# Patient Record
Sex: MALE | Race: WHITE | NOT HISPANIC OR LATINO | URBAN - METROPOLITAN AREA
[De-identification: names, ages, dates, MRNs, and addresses within clinical notes are randomized per-mention and may not be internally consistent; named-entity substitution may affect disease eponyms.]

---

## 2023-02-22 ENCOUNTER — INPATIENT (INPATIENT)
Facility: HOSPITAL | Age: 73
LOS: 2 days | Discharge: HOME HEALTH | DRG: 193 | End: 2023-02-25
Attending: INTERNAL MEDICINE | Admitting: INTERNAL MEDICINE
Payer: COMMERCIAL

## 2023-02-22 VITALS
WEIGHT: 199.96 LBS | RESPIRATION RATE: 19 BRPM | OXYGEN SATURATION: 94 % | TEMPERATURE: 98 F | HEIGHT: 68 IN | HEART RATE: 72 BPM

## 2023-02-22 DIAGNOSIS — J06.9 ACUTE UPPER RESPIRATORY INFECTION, UNSPECIFIED: ICD-10-CM

## 2023-02-22 LAB
ALBUMIN SERPL ELPH-MCNC: 4.3 G/DL — SIGNIFICANT CHANGE UP (ref 3.3–5)
ALP SERPL-CCNC: 58 U/L — SIGNIFICANT CHANGE UP (ref 40–120)
ALT FLD-CCNC: 20 U/L — SIGNIFICANT CHANGE UP (ref 10–45)
ANION GAP SERPL CALC-SCNC: 15 MMOL/L — SIGNIFICANT CHANGE UP (ref 5–17)
AST SERPL-CCNC: 23 U/L — SIGNIFICANT CHANGE UP (ref 10–40)
BASE EXCESS BLDV CALC-SCNC: 5.2 MMOL/L — HIGH (ref -2–3)
BASOPHILS # BLD AUTO: 0.04 K/UL — SIGNIFICANT CHANGE UP (ref 0–0.2)
BASOPHILS NFR BLD AUTO: 0.5 % — SIGNIFICANT CHANGE UP (ref 0–2)
BILIRUB SERPL-MCNC: 0.6 MG/DL — SIGNIFICANT CHANGE UP (ref 0.2–1.2)
BUN SERPL-MCNC: 13 MG/DL — SIGNIFICANT CHANGE UP (ref 7–23)
CA-I SERPL-SCNC: 1.17 MMOL/L — SIGNIFICANT CHANGE UP (ref 1.15–1.33)
CALCIUM SERPL-MCNC: 9.2 MG/DL — SIGNIFICANT CHANGE UP (ref 8.4–10.5)
CHLORIDE BLDV-SCNC: 99 MMOL/L — SIGNIFICANT CHANGE UP (ref 96–108)
CHLORIDE SERPL-SCNC: 99 MMOL/L — SIGNIFICANT CHANGE UP (ref 96–108)
CO2 BLDV-SCNC: 33 MMOL/L — HIGH (ref 22–26)
CO2 SERPL-SCNC: 24 MMOL/L — SIGNIFICANT CHANGE UP (ref 22–31)
CREAT SERPL-MCNC: 0.87 MG/DL — SIGNIFICANT CHANGE UP (ref 0.5–1.3)
EGFR: 91 ML/MIN/1.73M2 — SIGNIFICANT CHANGE UP
EOSINOPHIL # BLD AUTO: 0.12 K/UL — SIGNIFICANT CHANGE UP (ref 0–0.5)
EOSINOPHIL NFR BLD AUTO: 1.5 % — SIGNIFICANT CHANGE UP (ref 0–6)
GAS PNL BLDV: 135 MMOL/L — LOW (ref 136–145)
GAS PNL BLDV: SIGNIFICANT CHANGE UP
GLUCOSE BLDC GLUCOMTR-MCNC: 199 MG/DL — HIGH (ref 70–99)
GLUCOSE BLDV-MCNC: 170 MG/DL — HIGH (ref 70–99)
GLUCOSE SERPL-MCNC: 160 MG/DL — HIGH (ref 70–99)
HCO3 BLDV-SCNC: 31 MMOL/L — HIGH (ref 22–29)
HCT VFR BLD CALC: 47.3 % — SIGNIFICANT CHANGE UP (ref 39–50)
HCT VFR BLDA CALC: 47 % — SIGNIFICANT CHANGE UP (ref 39–51)
HGB BLD CALC-MCNC: 15.6 G/DL — SIGNIFICANT CHANGE UP (ref 12.6–17.4)
HGB BLD-MCNC: 15.1 G/DL — SIGNIFICANT CHANGE UP (ref 13–17)
HMPV RNA SPEC QL NAA+PROBE: DETECTED
IMM GRANULOCYTES NFR BLD AUTO: 0.2 % — SIGNIFICANT CHANGE UP (ref 0–0.9)
LACTATE BLDV-MCNC: 2 MMOL/L — SIGNIFICANT CHANGE UP (ref 0.5–2)
LYMPHOCYTES # BLD AUTO: 3.22 K/UL — SIGNIFICANT CHANGE UP (ref 1–3.3)
LYMPHOCYTES # BLD AUTO: 40 % — SIGNIFICANT CHANGE UP (ref 13–44)
MCHC RBC-ENTMCNC: 31.5 PG — SIGNIFICANT CHANGE UP (ref 27–34)
MCHC RBC-ENTMCNC: 31.9 GM/DL — LOW (ref 32–36)
MCV RBC AUTO: 98.7 FL — SIGNIFICANT CHANGE UP (ref 80–100)
MONOCYTES # BLD AUTO: 1.03 K/UL — HIGH (ref 0–0.9)
MONOCYTES NFR BLD AUTO: 12.8 % — SIGNIFICANT CHANGE UP (ref 2–14)
NEUTROPHILS # BLD AUTO: 3.62 K/UL — SIGNIFICANT CHANGE UP (ref 1.8–7.4)
NEUTROPHILS NFR BLD AUTO: 45 % — SIGNIFICANT CHANGE UP (ref 43–77)
NRBC # BLD: 0 /100 WBCS — SIGNIFICANT CHANGE UP (ref 0–0)
NT-PROBNP SERPL-SCNC: 244 PG/ML — SIGNIFICANT CHANGE UP (ref 0–300)
PCO2 BLDV: 49 MMHG — SIGNIFICANT CHANGE UP (ref 42–55)
PH BLDV: 7.41 — SIGNIFICANT CHANGE UP (ref 7.32–7.43)
PLATELET # BLD AUTO: 162 K/UL — SIGNIFICANT CHANGE UP (ref 150–400)
PO2 BLDV: 47 MMHG — HIGH (ref 25–45)
POTASSIUM BLDV-SCNC: 4.1 MMOL/L — SIGNIFICANT CHANGE UP (ref 3.5–5.1)
POTASSIUM SERPL-MCNC: 4.1 MMOL/L — SIGNIFICANT CHANGE UP (ref 3.5–5.3)
POTASSIUM SERPL-SCNC: 4.1 MMOL/L — SIGNIFICANT CHANGE UP (ref 3.5–5.3)
PROCALCITONIN SERPL-MCNC: 0.07 NG/ML — SIGNIFICANT CHANGE UP (ref 0.02–0.1)
PROT SERPL-MCNC: 7.5 G/DL — SIGNIFICANT CHANGE UP (ref 6–8.3)
RAPID RVP RESULT: DETECTED
RBC # BLD: 4.79 M/UL — SIGNIFICANT CHANGE UP (ref 4.2–5.8)
RBC # FLD: 13.9 % — SIGNIFICANT CHANGE UP (ref 10.3–14.5)
SAO2 % BLDV: 84.3 % — SIGNIFICANT CHANGE UP (ref 67–88)
SARS-COV-2 RNA SPEC QL NAA+PROBE: SIGNIFICANT CHANGE UP
SODIUM SERPL-SCNC: 138 MMOL/L — SIGNIFICANT CHANGE UP (ref 135–145)
TROPONIN T, HIGH SENSITIVITY RESULT: 13 NG/L — SIGNIFICANT CHANGE UP (ref 0–51)
TROPONIN T, HIGH SENSITIVITY RESULT: 15 NG/L — SIGNIFICANT CHANGE UP (ref 0–51)
WBC # BLD: 8.05 K/UL — SIGNIFICANT CHANGE UP (ref 3.8–10.5)
WBC # FLD AUTO: 8.05 K/UL — SIGNIFICANT CHANGE UP (ref 3.8–10.5)

## 2023-02-22 PROCEDURE — 99291 CRITICAL CARE FIRST HOUR: CPT

## 2023-02-22 PROCEDURE — 71045 X-RAY EXAM CHEST 1 VIEW: CPT | Mod: 26

## 2023-02-22 RX ORDER — ATORVASTATIN CALCIUM 80 MG/1
40 TABLET, FILM COATED ORAL AT BEDTIME
Refills: 0 | Status: DISCONTINUED | OUTPATIENT
Start: 2023-02-22 | End: 2023-02-25

## 2023-02-22 RX ORDER — LEVOTHYROXINE SODIUM 125 MCG
100 TABLET ORAL DAILY
Refills: 0 | Status: DISCONTINUED | OUTPATIENT
Start: 2023-02-22 | End: 2023-02-25

## 2023-02-22 RX ORDER — RIVAROXABAN 15 MG-20MG
10 KIT ORAL DAILY
Refills: 0 | Status: DISCONTINUED | OUTPATIENT
Start: 2023-02-22 | End: 2023-02-25

## 2023-02-22 RX ORDER — IPRATROPIUM/ALBUTEROL SULFATE 18-103MCG
3 AEROSOL WITH ADAPTER (GRAM) INHALATION ONCE
Refills: 0 | Status: COMPLETED | OUTPATIENT
Start: 2023-02-22 | End: 2023-02-22

## 2023-02-22 RX ORDER — ASPIRIN/CALCIUM CARB/MAGNESIUM 324 MG
1 TABLET ORAL
Qty: 0 | Refills: 0 | DISCHARGE

## 2023-02-22 RX ORDER — LOSARTAN POTASSIUM 100 MG/1
1 TABLET, FILM COATED ORAL
Qty: 0 | Refills: 0 | DISCHARGE

## 2023-02-22 RX ORDER — DEXTROSE 50 % IN WATER 50 %
15 SYRINGE (ML) INTRAVENOUS ONCE
Refills: 0 | Status: DISCONTINUED | OUTPATIENT
Start: 2023-02-22 | End: 2023-02-25

## 2023-02-22 RX ORDER — IPRATROPIUM/ALBUTEROL SULFATE 18-103MCG
3 AEROSOL WITH ADAPTER (GRAM) INHALATION EVERY 6 HOURS
Refills: 0 | Status: DISCONTINUED | OUTPATIENT
Start: 2023-02-22 | End: 2023-02-25

## 2023-02-22 RX ORDER — LOSARTAN POTASSIUM 100 MG/1
25 TABLET, FILM COATED ORAL DAILY
Refills: 0 | Status: DISCONTINUED | OUTPATIENT
Start: 2023-02-22 | End: 2023-02-25

## 2023-02-22 RX ORDER — METFORMIN HYDROCHLORIDE 850 MG/1
1 TABLET ORAL
Qty: 0 | Refills: 0 | DISCHARGE

## 2023-02-22 RX ORDER — DEXTROSE 50 % IN WATER 50 %
25 SYRINGE (ML) INTRAVENOUS ONCE
Refills: 0 | Status: DISCONTINUED | OUTPATIENT
Start: 2023-02-22 | End: 2023-02-25

## 2023-02-22 RX ORDER — SODIUM CHLORIDE 9 MG/ML
1000 INJECTION, SOLUTION INTRAVENOUS
Refills: 0 | Status: DISCONTINUED | OUTPATIENT
Start: 2023-02-22 | End: 2023-02-25

## 2023-02-22 RX ORDER — ASPIRIN/CALCIUM CARB/MAGNESIUM 324 MG
81 TABLET ORAL DAILY
Refills: 0 | Status: DISCONTINUED | OUTPATIENT
Start: 2023-02-22 | End: 2023-02-25

## 2023-02-22 RX ORDER — CARVEDILOL PHOSPHATE 80 MG/1
1 CAPSULE, EXTENDED RELEASE ORAL
Qty: 0 | Refills: 0 | DISCHARGE

## 2023-02-22 RX ORDER — CARVEDILOL PHOSPHATE 80 MG/1
6.25 CAPSULE, EXTENDED RELEASE ORAL EVERY 12 HOURS
Refills: 0 | Status: DISCONTINUED | OUTPATIENT
Start: 2023-02-22 | End: 2023-02-25

## 2023-02-22 RX ORDER — SIMVASTATIN 20 MG/1
1 TABLET, FILM COATED ORAL
Qty: 0 | Refills: 0 | DISCHARGE

## 2023-02-22 RX ORDER — BUDESONIDE AND FORMOTEROL FUMARATE DIHYDRATE 160; 4.5 UG/1; UG/1
2 AEROSOL RESPIRATORY (INHALATION)
Refills: 0 | Status: DISCONTINUED | OUTPATIENT
Start: 2023-02-22 | End: 2023-02-25

## 2023-02-22 RX ORDER — LEVOTHYROXINE SODIUM 125 MCG
1 TABLET ORAL
Qty: 0 | Refills: 0 | DISCHARGE

## 2023-02-22 RX ORDER — GLUCAGON INJECTION, SOLUTION 0.5 MG/.1ML
1 INJECTION, SOLUTION SUBCUTANEOUS ONCE
Refills: 0 | Status: DISCONTINUED | OUTPATIENT
Start: 2023-02-22 | End: 2023-02-25

## 2023-02-22 RX ORDER — INSULIN LISPRO 100/ML
VIAL (ML) SUBCUTANEOUS
Refills: 0 | Status: DISCONTINUED | OUTPATIENT
Start: 2023-02-22 | End: 2023-02-25

## 2023-02-22 RX ORDER — DEXTROSE 50 % IN WATER 50 %
12.5 SYRINGE (ML) INTRAVENOUS ONCE
Refills: 0 | Status: DISCONTINUED | OUTPATIENT
Start: 2023-02-22 | End: 2023-02-25

## 2023-02-22 RX ORDER — EMPAGLIFLOZIN 10 MG/1
1 TABLET, FILM COATED ORAL
Qty: 0 | Refills: 0 | DISCHARGE

## 2023-02-22 RX ADMIN — Medication 3 MILLILITER(S): at 23:19

## 2023-02-22 RX ADMIN — Medication 20 MILLIGRAM(S): at 23:24

## 2023-02-22 RX ADMIN — BUDESONIDE AND FORMOTEROL FUMARATE DIHYDRATE 2 PUFF(S): 160; 4.5 AEROSOL RESPIRATORY (INHALATION) at 17:34

## 2023-02-22 RX ADMIN — Medication 3 MILLILITER(S): at 17:34

## 2023-02-22 RX ADMIN — Medication 3 MILLILITER(S): at 12:53

## 2023-02-22 RX ADMIN — Medication 125 MILLIGRAM(S): at 12:15

## 2023-02-22 RX ADMIN — Medication 3 MILLILITER(S): at 12:15

## 2023-02-22 RX ADMIN — Medication 3 MILLILITER(S): at 13:31

## 2023-02-22 RX ADMIN — Medication 20 MILLIGRAM(S): at 17:34

## 2023-02-22 NOTE — H&P ADULT - NSHPPHYSICALEXAM_GEN_ALL_CORE
T(C): 37.2 (02-22-23 @ 11:58), Max: 37.2 (02-22-23 @ 11:58)  HR: 80 (02-22-23 @ 11:58) (72 - 80)  BP: 136/73 (02-22-23 @ 11:58) (136/73 - 136/73)  RR: 21 (02-22-23 @ 11:58) (19 - 21)  SpO2: 100% (02-22-23 @ 11:58) (93% - 100%)    PHYSICAL EXAM:  GENERAL: NAD, well-developed  HEAD:  Atraumatic, Normocephalic  EYES: EOMI, PERRLA, conjunctiva and sclera clear  NECK: Supple, No JVD  CHEST/LUNG: Clear to auscultation bilaterally; No wheeze  HEART: Regular rate and rhythm; No murmurs, rubs, or gallops  ABDOMEN: Soft, Nontender, Nondistended; Bowel sounds present  EXTREMITIES:  2+ Peripheral Pulses, No clubbing, cyanosis, or edema  PSYCH: AAOx3  NEUROLOGY: non-focal  SKIN: No rashes or lesions

## 2023-02-22 NOTE — ED ADULT NURSE NOTE - HEART RATE (BEATS/MIN)
Pt here for port labs  Accessed without difficulty  Labs drawn and sent  Pt tolerated procedure well  Pt left unit ambulatory with steady gait    Pt refused AVS  80

## 2023-02-22 NOTE — ED PROVIDER NOTE - CLINICAL SUMMARY MEDICAL DECISION MAKING FREE TEXT BOX
73 yo M former smoker CAD(stent), hld, htn, dm, and MI presenting with sob x 6 days. Wheezing diffusely on exam. Will obtain ekg, labs, and cxr. Will give duoneb and steroids. Reassess pending results 71 yo M former smoker CAD(stent), hld, htn, dm, and MI presenting with sob x 6 days. Wheezing diffusely on exam. Will obtain ekg, labs, and cxr. Will give duoneb and steroids. Reassess pending results    Tobias: 72 year old male with htn, hld, dm here with sob x 6 days. + wheezing b/l.  mild hypoxic on exam. former smoker, sob worse with walking. will get labs, cxr, ekg. will give duoneb, steroids, rvp.  admit for hypoxia, reassess

## 2023-02-22 NOTE — PATIENT PROFILE ADULT - FALL HARM RISK - HARM RISK INTERVENTIONS

## 2023-02-22 NOTE — H&P ADULT - HISTORY OF PRESENT ILLNESS
71 yo M former smoker CAD(stent), hld, htn, dm, and MI presenting with sob x 6 days. As per daughter, patient's wife also sick. Patient has been having sob and productive cough. Symptoms worsened the past two days. Patient went to PCP and found to be hypoxic to 90. Patient denies chest pain, fever, abd pain, nausea, vomiting, diarrhea, dysuria and back pain.

## 2023-02-22 NOTE — ED PROVIDER NOTE - OBJECTIVE STATEMENT
73 yo M former smoker CAD(stent), hld, htn, dm, and MI presenting with sob x 6 days. As per daughter, patient's wife also sick. Patient has been having sob and productive cough. Symptoms worsened the past two days. Patient went to PCP and found to be hypoxic to 90. Patient denies chest pain, fever, abd pain, nvd, dysuria and back pain.

## 2023-02-22 NOTE — PATIENT PROFILE ADULT - COMPLETE THE FOLLOWING IF THE PATIENT REFUSES THE INFLUENZA VACCINE:
Patient Information     Patient Name MRN Denton Matthews [id number] Male 1960      Nursing Note by Heather Waldrop at 2018  9:45 AM     Author:  Heather Waldrop  Service:  (none) Author Type:  (none)     Filed:  2018  9:48 AM  Encounter Date:  2018 Status:  Addendum     :  Heather Waldrop        Related Notes: Original Note by Heather Waldrop filed at 2018  9:38 AM            Patient is here today to discuss right shoulder pain  Fell on it 17  Heather Waldrop LPN 2018 9:35 AM           Risks/benefits discussed with patient/surrogate/Vaccine Information Sheet (VIS) provided-VIS date: 8/6/21

## 2023-02-22 NOTE — ED ADULT NURSE NOTE - OBJECTIVE STATEMENT
Pt presented to the ED in NAD, A&O x 4, breathing on room air, audible wheezing. Pt c/o cough and SOB since Friday, worsened in the last 2 days.

## 2023-02-22 NOTE — H&P ADULT - ASSESSMENT
73 yo M former smoker CAD(stent), hld, htn, dm, and MI presenting with sob x 6 days. As per daughter, patient's wife also sick. Patient has been having sob and productive cough. Symptoms worsened the past two days. Patient went to PCP and found to be hypoxic to 90. Patient denies chest pain, fever, abd pain, nvd, dysuria and back pain. ptn was seen by Pulm and Cardiology  Ptn has  no underlying lung disease:  he is an ex smoker,   acute Tracheobronchitis 2/2  HMPV infection with sob  cough and wheezing requiring 2 L O2NC   cxr is clear, start Medrol IV 20 mg q8H, no need for antimicrobials. will get CT chest to R/O further lung pathology  cont outptn meds  place on insulin on a sliding scale, check HA1C, check TSH  DVT ppx w po Xarelto

## 2023-02-23 ENCOUNTER — TRANSCRIPTION ENCOUNTER (OUTPATIENT)
Age: 73
End: 2023-02-23

## 2023-02-23 LAB
A1C WITH ESTIMATED AVERAGE GLUCOSE RESULT: 7.7 % — HIGH (ref 4–5.6)
ANION GAP SERPL CALC-SCNC: 19 MMOL/L — HIGH (ref 5–17)
BUN SERPL-MCNC: 30 MG/DL — HIGH (ref 7–23)
CALCIUM SERPL-MCNC: 9.1 MG/DL — SIGNIFICANT CHANGE UP (ref 8.4–10.5)
CHLORIDE SERPL-SCNC: 100 MMOL/L — SIGNIFICANT CHANGE UP (ref 96–108)
CO2 SERPL-SCNC: 22 MMOL/L — SIGNIFICANT CHANGE UP (ref 22–31)
CREAT SERPL-MCNC: 0.76 MG/DL — SIGNIFICANT CHANGE UP (ref 0.5–1.3)
EGFR: 95 ML/MIN/1.73M2 — SIGNIFICANT CHANGE UP
ESTIMATED AVERAGE GLUCOSE: 174 MG/DL — HIGH (ref 68–114)
GLUCOSE BLDC GLUCOMTR-MCNC: 144 MG/DL — HIGH (ref 70–99)
GLUCOSE BLDC GLUCOMTR-MCNC: 194 MG/DL — HIGH (ref 70–99)
GLUCOSE BLDC GLUCOMTR-MCNC: 223 MG/DL — HIGH (ref 70–99)
GLUCOSE BLDC GLUCOMTR-MCNC: 237 MG/DL — HIGH (ref 70–99)
GLUCOSE BLDC GLUCOMTR-MCNC: 252 MG/DL — HIGH (ref 70–99)
GLUCOSE SERPL-MCNC: 263 MG/DL — HIGH (ref 70–99)
HCT VFR BLD CALC: 45.7 % — SIGNIFICANT CHANGE UP (ref 39–50)
HCV AB S/CO SERPL IA: 0.07 S/CO — SIGNIFICANT CHANGE UP (ref 0–0.99)
HCV AB SERPL-IMP: SIGNIFICANT CHANGE UP
HGB BLD-MCNC: 14.9 G/DL — SIGNIFICANT CHANGE UP (ref 13–17)
MCHC RBC-ENTMCNC: 32.2 PG — SIGNIFICANT CHANGE UP (ref 27–34)
MCHC RBC-ENTMCNC: 32.6 GM/DL — SIGNIFICANT CHANGE UP (ref 32–36)
MCV RBC AUTO: 98.7 FL — SIGNIFICANT CHANGE UP (ref 80–100)
NRBC # BLD: 0 /100 WBCS — SIGNIFICANT CHANGE UP (ref 0–0)
PLATELET # BLD AUTO: 160 K/UL — SIGNIFICANT CHANGE UP (ref 150–400)
POTASSIUM SERPL-MCNC: 4.2 MMOL/L — SIGNIFICANT CHANGE UP (ref 3.5–5.3)
POTASSIUM SERPL-SCNC: 4.2 MMOL/L — SIGNIFICANT CHANGE UP (ref 3.5–5.3)
RBC # BLD: 4.63 M/UL — SIGNIFICANT CHANGE UP (ref 4.2–5.8)
RBC # FLD: 13.9 % — SIGNIFICANT CHANGE UP (ref 10.3–14.5)
SODIUM SERPL-SCNC: 141 MMOL/L — SIGNIFICANT CHANGE UP (ref 135–145)
WBC # BLD: 9 K/UL — SIGNIFICANT CHANGE UP (ref 3.8–10.5)
WBC # FLD AUTO: 9 K/UL — SIGNIFICANT CHANGE UP (ref 3.8–10.5)

## 2023-02-23 PROCEDURE — 71250 CT THORAX DX C-: CPT | Mod: 26

## 2023-02-23 RX ORDER — INSULIN GLARGINE 100 [IU]/ML
18 INJECTION, SOLUTION SUBCUTANEOUS AT BEDTIME
Refills: 0 | Status: DISCONTINUED | OUTPATIENT
Start: 2023-02-23 | End: 2023-02-24

## 2023-02-23 RX ORDER — INSULIN GLARGINE 100 [IU]/ML
16 INJECTION, SOLUTION SUBCUTANEOUS AT BEDTIME
Refills: 0 | Status: DISCONTINUED | OUTPATIENT
Start: 2023-02-23 | End: 2023-02-23

## 2023-02-23 RX ORDER — INSULIN LISPRO 100/ML
6 VIAL (ML) SUBCUTANEOUS
Refills: 0 | Status: DISCONTINUED | OUTPATIENT
Start: 2023-02-23 | End: 2023-02-24

## 2023-02-23 RX ADMIN — BUDESONIDE AND FORMOTEROL FUMARATE DIHYDRATE 2 PUFF(S): 160; 4.5 AEROSOL RESPIRATORY (INHALATION) at 05:43

## 2023-02-23 RX ADMIN — Medication 20 MILLIGRAM(S): at 05:42

## 2023-02-23 RX ADMIN — LOSARTAN POTASSIUM 25 MILLIGRAM(S): 100 TABLET, FILM COATED ORAL at 05:42

## 2023-02-23 RX ADMIN — Medication 100 MICROGRAM(S): at 05:43

## 2023-02-23 RX ADMIN — RIVAROXABAN 10 MILLIGRAM(S): KIT at 12:18

## 2023-02-23 RX ADMIN — Medication 1 TABLET(S): at 12:17

## 2023-02-23 RX ADMIN — Medication 3 MILLILITER(S): at 05:43

## 2023-02-23 RX ADMIN — ATORVASTATIN CALCIUM 40 MILLIGRAM(S): 80 TABLET, FILM COATED ORAL at 22:03

## 2023-02-23 RX ADMIN — Medication 20 MILLIGRAM(S): at 12:16

## 2023-02-23 RX ADMIN — Medication 1: at 17:47

## 2023-02-23 RX ADMIN — BUDESONIDE AND FORMOTEROL FUMARATE DIHYDRATE 2 PUFF(S): 160; 4.5 AEROSOL RESPIRATORY (INHALATION) at 17:49

## 2023-02-23 RX ADMIN — Medication 3 MILLILITER(S): at 17:49

## 2023-02-23 RX ADMIN — Medication 3 MILLILITER(S): at 12:17

## 2023-02-23 RX ADMIN — Medication 40 MILLIGRAM(S): at 18:23

## 2023-02-23 RX ADMIN — CARVEDILOL PHOSPHATE 6.25 MILLIGRAM(S): 80 CAPSULE, EXTENDED RELEASE ORAL at 05:42

## 2023-02-23 RX ADMIN — INSULIN GLARGINE 18 UNIT(S): 100 INJECTION, SOLUTION SUBCUTANEOUS at 21:53

## 2023-02-23 RX ADMIN — CARVEDILOL PHOSPHATE 6.25 MILLIGRAM(S): 80 CAPSULE, EXTENDED RELEASE ORAL at 17:49

## 2023-02-23 RX ADMIN — Medication 2: at 12:18

## 2023-02-23 RX ADMIN — Medication 81 MILLIGRAM(S): at 12:17

## 2023-02-23 NOTE — CONSULT NOTE ADULT - ASSESSMENT
Assessment  DMT2: 72y Male with DM T2 with hyperglycemia on insulin, blood sugars running high, in acceptable range, improving, fluctuating, had no hypoglycemic episode,  eating meals,  non compliant with low carb diet.  CAD: On medications, stable, monitored.  Hypothyroidism:  On synthroid  mcg po daily, asymptomatic.  HTN: Controlled, On med.  Pneumonia: On RX F/U by PCP    Plan:   DMT2:   Change Lantus to 18   units qhs, start Admelog 6    units before each meal in addition to correction scale coverage, monitor FS will FU  Patient counseled for compliance with consistant low carb diet  Hypothyroidism:  On synthroid 100 mcg po daily, asymptomatic  CAD: Continue treatment, monitoring   HTN: Continue treatment, monitoring, Primary team FU  Pneumonia: On RX F/U by PCP    Thank you for consult  Dr Hanks  [phone]  
73 yo M former smoker CAD(stent), hld, htn, dm, and MI presenting with sob x 6 days. As per daughter, patient's wife also sick. Patient has been having sob and productive cough. Symptoms worsened the past two days. Patient went to PCP and found to be hypoxic to 90. Patient denies chest pain, fever, abd pain, nvd, dysuria and back pain.:    he has  no underlying lung disease:  he is an e x smoker:   now he is found to ahve hMPV infection:  he is sob and has cough with wheezing and is on 2 L of oxygen  :      hMPV induced wheezing:  cxr is clear:   ex smoker:   CAD:  htn:  dm:    2/22:    hMPV induced wheezing:  cxr is clear:  ADD STEROIDS  HIS PH IS PRETTY GOOD:  NO SOB AT REST:  WHEEZING:  RECED STEROIDS IN ER:  CONT STEROIDS  : AND bd :  CXR IS CLEAR:  NO need for antibiotics  check procal    ex smoker: would probably need ct chest on this admission  CAD: cont current care:   htn: controlled  dm: Monitor and control :    dvt prophylaxis    dw acp 
A/P    71 yo M former smoker CAD(stent), hld, htn, dm, and MI presenting with sob x 6 days.     #acute hypoxic resp failure  -hpmv  -supportive care  -med/pulmonary f/u  -no decomp HF    #CAD, s/p PCI  -cv stable  -no acs  -asa    dvt ppx    d/w dtr at bedside      70 minutes spent on total encounter; more than 50% of the visit was spent counseling and/or coordinating care by the attending physician.

## 2023-02-23 NOTE — DISCHARGE NOTE PROVIDER - PROVIDER TOKENS
PROVIDER:[TOKEN:[[id number]]],PROVIDER:[TOKEN:[[id number]]] PROVIDER:[TOKEN:[[id number]]],PROVIDER:[TOKEN:[[id number]]],PROVIDER:[TOKEN:[[id number]],FOLLOWUP:[2 weeks]]

## 2023-02-23 NOTE — CHART NOTE - NSCHARTNOTEFT_GEN_A_CORE
Patient requiring continuous & portable nasal cannula oxygen @2 L/min  Patient was seen today for there diagnosis of  COPD/HMPV .  While in a chronic and stable state patient is still hypoxic due to there base line_O2_ while exerting. SPo2 89%__  at rest on room air; upon exertion on room air patient SPO2 drops to _87%__.    However with 2  Lpm applied durring exertion SPO2 improves to_93q%__  Patient will require _2_ LPM oxygen n/c during sleep and upon exertion.  DONY Quintana NP

## 2023-02-23 NOTE — DISCHARGE NOTE PROVIDER - NSDCMRMEDTOKEN_GEN_ALL_CORE_FT
aspirin 81 mg oral delayed release tablet: 1 tab(s) orally once a day  carvedilol 6.25 mg oral tablet: 1 tab(s) orally 2 times a day  Jardiance 25 mg oral tablet: 1 tab(s) orally once a day (in the morning)  levothyroxine 100 mcg (0.1 mg) oral tablet: 1 tab(s) orally once a day  losartan 25 mg oral tablet: 1 tab(s) orally once a day  metFORMIN 500 mg oral tablet, extended release: 1 tab(s) orally 2 times a day  multivitamin: 1 tab(s) orally once a day  simvastatin 80 mg oral tablet: 1 tab(s) orally once a day (at bedtime)   aspirin 81 mg oral delayed release tablet: 1 tab(s) orally once a day  budesonide-formoterol 160 mcg-4.5 mcg/inh inhalation aerosol: 2 puff(s) inhaled 2 times a day   carvedilol 6.25 mg oral tablet: 1 tab(s) orally 2 times a day  glimepiride 2 mg oral tablet: 1 tab(s) orally once a day   Jardiance 25 mg oral tablet: 1 tab(s) orally once a day (in the morning)  levothyroxine 100 mcg (0.1 mg) oral tablet: 1 tab(s) orally once a day  losartan 25 mg oral tablet: 1 tab(s) orally once a day  metFORMIN 500 mg oral tablet, extended release: 1 tab(s) orally 2 times a day  multivitamin: 1 tab(s) orally once a day  predniSONE 20 mg oral tablet: 2 tab(s) orally once a day  simvastatin 80 mg oral tablet: 1 tab(s) orally once a day (at bedtime)

## 2023-02-23 NOTE — DISCHARGE NOTE PROVIDER - HOSPITAL COURSE
73 yo M former smoker CAD(stent), hld, htn, dm, and MI presenting with sob x 6 days. As per daughter, patient's wife also sick. Patient has been having sob and productive cough. Symptoms worsened the past two days. Patient went to PCP and found to be hypoxic to 90. Patient denies chest pain, fever, abd pain, nvd, dysuria and back pain. Pt was seen by pulm and  cards    Pulm -> Ptn has  no underlying lung disease:  he is an ex smoker,     Acute Tracheobronchitis 2/2  HMPV infection   with sob  cough and wheezing requiring 2 L O2NC   cxr is clear, start Medrol IV 20 mg q8H, no need for antimicrobials. -> will transition to prednisone 40 md x5 days   will get CT chest to R/O further lung pathology:  Multifocal clusters of tree-in-bud nodules in the left lower lobe,  related to infectious/inflammatory small airways disease- likely secondary to viral infection  now improving     CAD   s/p PCI  -cv stable  -no acs  -asa, atorvastatin    HTN  -Cont coreg  -Cont losartan    DM  Endo c/s _____ 73 yo M former smoker CAD(stent), hld, htn, dm, and MI presenting with sob x 6 days. As per daughter, patient's wife also sick. Patient has been having sob and productive cough. Symptoms worsened the past two days. Patient went to PCP and found to be hypoxic to 90. Patient denies chest pain, fever, abd pain, nvd, dysuria and back pain.   Pt was seen by Pulm and Cardiology  Pt has no underlying lung disease:  he is an ex smoker,   acute Tracheobronchitis and viral PNA 2/2  HMPV infection with sob  cough and wheezing requiring 2 L O2NC   feeling much better, needs home O2, desats to 87%  cont  on Po prednisone for 5 days,   elevated FS, ENdo following  DVT ppx w po Xarelto 73 yo M former smoker CAD(stent), hld, htn, dm, and MI presenting with sob x 6 days. As per daughter, patient's wife also sick. Patient has been having sob and productive cough. Symptoms worsened the past two days. Patient went to PCP and found to be hypoxic to 90. Patient denies chest pain, fever, abd pain, nvd, dysuria and back pain.   Pt was seen by Pulm and Cardiology  Pt has no underlying lung disease:  he is an ex smoker,   acute Tracheobronchitis and viral PNA 2/2  HMPV infection with sob  cough and wheezing requiring 2 L O2NC   feeling much better, needs home O2, desats to 87%  cont  on Po prednisone for 5 days and symbicort  elevated FS, ENdo following - will discharge on home regimen, plus Glimepiride 2mg QD  DVT ppx w po Xarelto

## 2023-02-23 NOTE — DISCHARGE NOTE PROVIDER - CARE PROVIDER_API CALL
BERTO GARCIA  Internal Medicine  [address] [place] [address]  Phone: [phone]  Fax: [phone])-  Follow Up Time:     Lee Young)  Critical Care Medicine; Internal Medicine; Pulmonary Disease  [address] [place] [address]  Phone: [phone]  Fax: [phone]  Follow Up Time:    BERTO GARCIA  Internal Medicine  [address] [place] [address]  Phone: ()-  Fax: ()-  Follow Up Time:     Lee Young)  Critical Care Medicine; Internal Medicine; Pulmonary Disease  [address] [place] [address]  Phone: [phone]  Fax: [phone]  Follow Up Time:     Chay Hanks)  EndocrinologyMetabDiabetes; Internal Medicine  [address] [facility] [address]  [place] [address]  Phone: [phone]  Fax: [phone]  Follow Up Time: 2 weeks

## 2023-02-23 NOTE — DISCHARGE NOTE PROVIDER - NSDCCPCAREPLAN_GEN_ALL_CORE_FT
PRINCIPAL DISCHARGE DIAGNOSIS  Diagnosis: Human metapneumovirus (hMPV) pneumonia  Assessment and Plan of Treatment: You have an upper viral infection. You were treated with a course of IV steriods. You will transition to oral steriods. Please folowup with your PCP and Pulmonologist      SECONDARY DISCHARGE DIAGNOSES  Diagnosis: HLD (hyperlipidemia)  Assessment and Plan of Treatment: Continue with your cholesterol medications. Eat a heart healthy diet that is low in saturated fats and salt, and includes whole grains, fruits, vegetables and lean protein; exercise regularly (consult with your physician or cardiologist first); maintain a heart healthy weight; if you smoke - quit (A resource to help you stop smoking is the [facility] – phone number 727-783-0273.). Continue to follow with your primary physician or cardiologist.  Seek medical help for dizziness, Lightheadedness, Blurry vision, Headache, Chest pain, Shortness of breath      Diagnosis: Benign essential HTN  Assessment and Plan of Treatment: Low salt diet  Activity as tolerated.  Take all medication as prescribed.  Follow up with your medical doctor for routine blood pressure monitoring at your next visit.  Notify your doctor if you have any of the following symptoms:   Dizziness, Lightheadedness, Blurry vision, Headache, Chest pain, Shortness of breath      Diagnosis: Diabetes mellitus  Assessment and Plan of Treatment: Make sure you get your HgA1c checked every three months.  If you take oral diabetes medications, check your blood glucose two times a day.  If you take insulin, check your blood glucose before meals and at bedtime.  It's important not to skip any meals.  Keep a log of your blood glucose results and always take it with you to your doctor appointments.  Keep a list of your current medications including injectables and over the counter medications and bring this medication list with you to all your doctor appointments.  If you have not seen your ophthalmologist this year call for appointment.  Check your feet daily for redness, sores, or openings. Do not self treat. If no improvement in two days call your primary care physician for an appointment.  Low blood sugar (hypoglycemia) is a blood sugar below 70mg/dl. Check your blood sugar if you feel signs/symptoms of hypoglycemia. If your blood sugar is below 70 take 15 grams of carbohydrates (ex 4 oz of apple juice, 3-4 glucose tablets, or 4-6 oz of regular soda) wait 15 minutes and repeat blood sugar to make sure it comes up above 70.  If your blood sugar is above 70 and you are due for a meal, have a meal.  If you are not due for a meal have a snack.  This snack helps keeps your blood sugar at a safe range.

## 2023-02-23 NOTE — CONSULT NOTE ADULT - SUBJECTIVE AND OBJECTIVE BOX
Endocrinology Attending Covering For Dr. Martin    HPI:  73 yo M former smoker CAD(stent), hld, htn, dm, and MI presenting with sob x 6 days. As per daughter, patient's wife also sick. Patient has been having sob and productive cough. Symptoms worsened the past two days. Patient went to PCP and found to be hypoxic to 90. Patient denies chest pain, fever, abd pain, nausea, vomiting, diarrhea, dysuria and back pain. (22 Feb 2023 16:49)  Patient has history of diabetes, on oral meds at home,   Metformin 500 mg BISD and Jardiance 25 mg daily, no recent hypoglycemic episodes, no polyuria polydipsia. Patient follows up with PCP for diabetes management. consulted  for high sugars after dose of steroids    PAST MEDICAL & SURGICAL HISTORY:  HTN (hypertension)      HLD (hyperlipidemia)      DM (diabetes mellitus)      Hypothyroid          FAMILY HISTORY:      Social History:    Outpatient Medications:    MEDICATIONS  (STANDING):  albuterol/ipratropium for Nebulization 3 milliLiter(s) Nebulizer every 6 hours  aspirin enteric coated 81 milliGRAM(s) Oral daily  atorvastatin 40 milliGRAM(s) Oral at bedtime  budesonide 160 MICROgram(s)/formoterol 4.5 MICROgram(s) Inhaler 2 Puff(s) Inhalation two times a day  carvedilol 6.25 milliGRAM(s) Oral every 12 hours  dextrose 5%. 1000 milliLiter(s) (50 mL/Hr) IV Continuous <Continuous>  dextrose 5%. 1000 milliLiter(s) (100 mL/Hr) IV Continuous <Continuous>  dextrose 50% Injectable 25 Gram(s) IV Push once  dextrose 50% Injectable 12.5 Gram(s) IV Push once  dextrose 50% Injectable 25 Gram(s) IV Push once  glucagon  Injectable 1 milliGRAM(s) IntraMuscular once  insulin glargine Injectable (LANTUS) 16 Unit(s) SubCutaneous at bedtime  insulin lispro (ADMELOG) corrective regimen sliding scale   SubCutaneous three times a day before meals  levothyroxine 100 MICROGram(s) Oral daily  losartan 25 milliGRAM(s) Oral daily  multivitamin 1 Tablet(s) Oral daily  predniSONE   Tablet 40 milliGRAM(s) Oral daily  rivaroxaban 10 milliGRAM(s) Oral daily    MEDICATIONS  (PRN):  dextrose Oral Gel 15 Gram(s) Oral once PRN Blood Glucose LESS THAN 70 milliGRAM(s)/deciliter      Allergies    No Known Allergies    Intolerances      Review of Systems:  Constitutional: No fever, no chills  Eyes: No blurry vision  Neuro: No tremors  HEENT: No pain, no neck swelling  Cardiovascular: No chest pain, no palpitations  Respiratory: Has SOB, no cough  GI: No nausea, vomiting, abdominal pain  : No dysuria  Skin: no rash  MSK: Has leg swelling, no foot ulcers.  Psych: no depression  Endocrine: no polyuria, polydipsia    ALL OTHER SYSTEMS REVIEWED AND NEGATIVE    UNABLE TO OBTAIN    PHYSICAL EXAM:  VITALS: T(C): 36.6 (02-23-23 @ 12:59)  T(F): 97.8 (02-23-23 @ 12:59), Max: 98.6 (02-23-23 @ 05:30)  HR: 78 (02-23-23 @ 12:59) (78 - 88)  BP: 107/66 (02-23-23 @ 17:00) (103/64 - 118/79)  RR:  (20 - 20)  SpO2:  (87% - 95%)  Wt(kg): --  GENERAL: NAD, well-groomed, well-developed  EYES: No proptosis, no lid lag  HEENT:  Atraumatic, Normocephalic  THYROID: Normal size, no palpable nodules  RESPIRATORY: Clear to auscultation bilaterally; No rales, rhonchi, wheezing  CARDIOVASCULAR: Si S2, No murmurs;  GI: Soft, non distended, normal bowel sounds  SKIN: Dry, intact, No rashes or lesions  MUSCULOSKELETAL: Has BL lower extremity edema.  NEURO:  no tremor, sensation decreased in feet BL,    POCT Blood Glucose.: 194 mg/dL (02-23-23 @ 17:05)  POCT Blood Glucose.: 237 mg/dL (02-23-23 @ 11:45)  POCT Blood Glucose.: 144 mg/dL (02-23-23 @ 08:04)  POCT Blood Glucose.: 199 mg/dL (02-22-23 @ 22:27)  POCT Blood Glucose.: 223 mg/dL (02-22-23 @ 21:43)                            14.9   9.00  )-----------( 160      ( 23 Feb 2023 10:10 )             45.7       02-23    141  |  100  |  30<H>  ----------------------------<  263<H>  4.2   |  22  |  0.76    eGFR: 95    Ca    9.1      02-23    TPro  7.5  /  Alb  4.3  /  TBili  0.6  /  DBili  x   /  AST  23  /  ALT  20  /  AlkPhos  58  02-22      Thyroid Function Tests:              Radiology:             
CARDIOLOGY CONSULT NOTE - DR. HUTCHINS    HPI:    73 yo M former smoker CAD(stent), hld, htn, dm, and MI presenting with sob x 6 days. As per daughter, patient's wife also sick. Patient has been having sob and productive cough. Symptoms worsened the past two days. Patient went to PCP and found to be hypoxic to 90. Patient denies chest pain, fever, abd pain, nvd, dysuria and back nasreen    no inc sob       PAST MEDICAL & SURGICAL HISTORY:  No pertinent past medical history            PREVIOUS DIAGNOSTIC TESTING:    [ ] Echocardiogram:  [ ]  Catheterization:  [ ] Stress Test:  	    MEDICATIONS:    Home Medications:      MEDICATIONS  (STANDING):      FAMILY HISTORY:  nc    SOCIAL HISTORY:    [x ] Non-smoker  [ ] Smoker  [ ] Alcohol    Allergies    No Known Allergies    Intolerances    	    REVIEW OF SYSTEMS:  CONSTITUTIONAL: No fever, weight loss, or fatigue  EYES: No eye pain, visual disturbances, or discharge  ENMT:  No difficulty hearing, tinnitus, vertigo; No sinus or throat pain  NECK: No pain or stiffness  RESPIRATORY: No cough, wheezing, chills or hemoptysis; + Shortness of Breath  CARDIOVASCULAR: as HPI  GASTROINTESTINAL: No abdominal or epigastric pain. No nausea, vomiting, or hematemesis; No diarrhea or constipation. No melena or hematochezia.  GENITOURINARY: No dysuria, frequency, hematuria, or incontinence  NEUROLOGICAL: No headaches, memory loss, loss of strength, numbness, or tremors  SKIN: No itching, burning, rashes, or lesions   	  [ ] All others negative	  [ ] Unable to obtain    PHYSICAL EXAM:    T(C): 37.2 (02-22-23 @ 11:58), Max: 37.2 (02-22-23 @ 11:58)  HR: 80 (02-22-23 @ 11:58) (72 - 80)  BP: 136/73 (02-22-23 @ 11:58) (136/73 - 136/73)  RR: 21 (02-22-23 @ 11:58) (19 - 21)  SpO2: 100% (02-22-23 @ 11:58) (93% - 100%)  Wt(kg): --  I&O's Summary    Daily Height in cm: 172.72 (22 Feb 2023 11:19)    Daily     Appearance: Normal	  Psychiatry: A & O x 3, Mood & affect appropriate  HEENT:   Normal oral mucosa, PERRL, EOMI	  Lymphatic: No lymphadenopathy  Cardiovascular: Normal S1 S2,RRR, No JVD, No murmurs  Respiratory: Lungs clear to auscultation	  Gastrointestinal:  Soft, Non-tender, + BS	  Skin: No rashes, No ecchymoses, No cyanosis	  Neurologic: Non-focal  Extremities: Normal range of motion, No clubbing, cyanosis or edema  Vascular: Peripheral pulses palpable 2+ bilaterally    TELEMETRY: 	    ECG:  	  RADIOLOGY:  OTHER: 	  	  LABS:	 	    CARDIAC MARKERS:        proBNP: Serum Pro-Brain Natriuretic Peptide: 244 pg/mL (02-22 @ 12:15)      Lipid Profile:   HgA1c:   TSH:                           15.1   8.05  )-----------( 162      ( 22 Feb 2023 12:15 )             47.3     02-22    138  |  99  |  13  ----------------------------<  160<H>  4.1   |  24  |  0.87    Ca    9.2      22 Feb 2023 12:15    TPro  7.5  /  Alb  4.3  /  TBili  0.6  /  DBili  x   /  AST  23  /  ALT  20  /  AlkPhos  58  02-22        Creatinine, Serum: 0.87 mg/dL (02-22-23 @ 12:15)        ASSESSMENT/PLAN: 	              
  02-22-23 @ 15:47    Patient is a 72y old  Male who presents with a chief complaint of     HPI:  71 yo M former smoker CAD(stent), hld, htn, dm, and MI presenting with sob x 6 days. As per daughter, patient's wife also sick. Patient has been having sob and productive cough. Symptoms worsened the past two days. Patient went to PCP and found to be hypoxic to 90. Patient denies chest pain, fever, abd pain, nvd, dysuria and back pain.:    he has  no underlying lung disease:  he is an e x smoker:   now he is found to ahve hMPV infection:  he is sob and has cough with wheezing and is on 2 L of oxygen      ?FOLLOWING PRESENT  [x ] Hx of PE/DVT, [ x] Hx COPD, [ x] Hx of Asthma, [ x] Hx of Hospitalization, [x ]  Hx of BiPAP/CPAP use, [x ] Hx of USAMA    Allergies    No Known Allergies    Intolerances        PAST MEDICAL & SURGICAL HISTORY:  No pertinent past medical history          FAMILY HISTORY:      Social History: [x  ] TOBACCO                  [ x ] ETOH                                 [ x ] IVDA/DRUGS    REVIEW OF SYSTEMS      General:	x    Skin/Breast:x  	  Ophthalmologic:x  	  ENMT:	x    Respiratory and Thorax: so b,  prado  ,  cough   	  Cardiovascular:	x    Gastrointestinal:	x  x  Genitourinary:	    Musculoskeletal:	x    Neurological:	x  x  Psychiatric:	    Hematology/Lymphatics:	x    Endocrine:	x    Allergic/Immunologic:	x    MEDICATIONS  (STANDING):    MEDICATIONS  (PRN):       Vital Signs Last 24 Hrs  T(C): 37.2 (22 Feb 2023 11:58), Max: 37.2 (22 Feb 2023 11:58)  T(F): 98.9 (22 Feb 2023 11:58), Max: 98.9 (22 Feb 2023 11:58)  HR: 80 (22 Feb 2023 11:58) (72 - 80)  BP: 136/73 (22 Feb 2023 11:58) (136/73 - 136/73)  BP(mean): --  RR: 21 (22 Feb 2023 11:58) (19 - 21)  SpO2: 100% (22 Feb 2023 11:58) (93% - 100%)    Parameters below as of 22 Feb 2023 11:58  Patient On (Oxygen Delivery Method): room air  O2 Flow (L/min): 2  Orthostatic VS          I&O's Summary      Physical Exam:   GENERAL: NAD, well-groomed, well-developed  HEENT: VALDO/   Atraumatic, Normocephalic  ENMT: No tonsillar erythema, exudates, or enlargement; Moist mucous membranes, Good dentition, No lesions  NECK: Supple, No JVD, Normal thyroid  CHEST/LUNG: Mild wheezing  CVS: Regular rate and rhythm; No murmurs, rubs, or gallops  GI: : Soft, Nontender, Nondistended; Bowel sounds present  NERVOUS SYSTEM:  Alert & Oriented X3  EXTREMITIES: - edema  LYMPH: No lymphadenopathy noted  SKIN: No rashes or lesions  ENDOCRINOLOGY: No Thyromegaly  PSYCH: Appropriate    Labs:  5.2<49<4>>47<<7.415>>5.2<<3><<4><<5<<479>>SARS-CoV-2: NotDetec (22 Feb 2023 12:15)                              15.1   8.05  )-----------( 162      ( 22 Feb 2023 12:15 )             47.3     02-22    138  |  99  |  13  ----------------------------<  160<H>  4.1   |  24  |  0.87    Ca    9.2      22 Feb 2023 12:15    TPro  7.5  /  Alb  4.3  /  TBili  0.6  /  DBili  x   /  AST  23  /  ALT  20  /  AlkPhos  58  02-22    CAPILLARY BLOOD GLUCOSE        LIVER FUNCTIONS - ( 22 Feb 2023 12:15 )  Alb: 4.3 g/dL / Pro: 7.5 g/dL / ALK PHOS: 58 U/L / ALT: 20 U/L / AST: 23 U/L / GGT: x               D DImer  Serum Pro-Brain Natriuretic Peptide: 244 pg/mL (02-22 @ 12:15)      Studies  Chest X-RAY  CT SCAN Chest   CT Abdomen  Venous Dopplers: LE:   Others    rad< from: Xray Chest 1 View- PORTABLE-Urgent (Xray Chest 1 View- PORTABLE-Urgent .) (02.22.23 @ 12:30) >    ACC: [id number] EXAM:  XR CHEST PORTABLE URGENT 1V   ORDERED BY: CANDIDA KHANNA     PROCEDURE DATE:  02/22/2023          INTERPRETATION:  EXAMINATION: XR CHEST URGENT    CLINICAL INDICATION: Shortness of breath    TECHNIQUE: Single frontal, portable view of the chest was obtained.    COMPARISON: None    FINDINGS:  The heart is normal in size.  The lungs are clear.  There is no pneumothorax or pleural effusion.    IMPRESSION:  Clear lungs.    --- End of Report ---           MARVIN WASHINGTON MD; Resident Radiologist  This document has been electronically signed.  CLEMENTINA MATHIAS MD; Attending Radiologist  This document has been electronically signed. Feb 22 2023  2:17PM    < end of copied text >      ct

## 2023-02-24 DIAGNOSIS — E78.5 HYPERLIPIDEMIA, UNSPECIFIED: ICD-10-CM

## 2023-02-24 DIAGNOSIS — I10 ESSENTIAL (PRIMARY) HYPERTENSION: ICD-10-CM

## 2023-02-24 DIAGNOSIS — E11.9 TYPE 2 DIABETES MELLITUS WITHOUT COMPLICATIONS: ICD-10-CM

## 2023-02-24 LAB
GLUCOSE BLDC GLUCOMTR-MCNC: 177 MG/DL — HIGH (ref 70–99)
GLUCOSE BLDC GLUCOMTR-MCNC: 193 MG/DL — HIGH (ref 70–99)
GLUCOSE BLDC GLUCOMTR-MCNC: 221 MG/DL — HIGH (ref 70–99)
GLUCOSE BLDC GLUCOMTR-MCNC: 250 MG/DL — HIGH (ref 70–99)
T4 FREE SERPL-MCNC: 1.5 NG/DL — SIGNIFICANT CHANGE UP (ref 0.9–1.8)
TSH SERPL-MCNC: 0.92 UIU/ML — SIGNIFICANT CHANGE UP (ref 0.27–4.2)

## 2023-02-24 RX ORDER — INSULIN GLARGINE 100 [IU]/ML
26 INJECTION, SOLUTION SUBCUTANEOUS AT BEDTIME
Refills: 0 | Status: DISCONTINUED | OUTPATIENT
Start: 2023-02-24 | End: 2023-02-25

## 2023-02-24 RX ORDER — INSULIN LISPRO 100/ML
10 VIAL (ML) SUBCUTANEOUS
Refills: 0 | Status: DISCONTINUED | OUTPATIENT
Start: 2023-02-24 | End: 2023-02-25

## 2023-02-24 RX ORDER — INSULIN LISPRO 100/ML
10 VIAL (ML) SUBCUTANEOUS ONCE
Refills: 0 | Status: COMPLETED | OUTPATIENT
Start: 2023-02-24 | End: 2023-02-24

## 2023-02-24 RX ADMIN — CARVEDILOL PHOSPHATE 6.25 MILLIGRAM(S): 80 CAPSULE, EXTENDED RELEASE ORAL at 05:47

## 2023-02-24 RX ADMIN — Medication 1: at 08:44

## 2023-02-24 RX ADMIN — INSULIN GLARGINE 26 UNIT(S): 100 INJECTION, SOLUTION SUBCUTANEOUS at 22:11

## 2023-02-24 RX ADMIN — Medication 3 MILLILITER(S): at 01:01

## 2023-02-24 RX ADMIN — BUDESONIDE AND FORMOTEROL FUMARATE DIHYDRATE 2 PUFF(S): 160; 4.5 AEROSOL RESPIRATORY (INHALATION) at 17:29

## 2023-02-24 RX ADMIN — ATORVASTATIN CALCIUM 40 MILLIGRAM(S): 80 TABLET, FILM COATED ORAL at 21:28

## 2023-02-24 RX ADMIN — Medication 3 MILLILITER(S): at 17:27

## 2023-02-24 RX ADMIN — Medication 3 MILLILITER(S): at 05:46

## 2023-02-24 RX ADMIN — Medication 100 MICROGRAM(S): at 05:47

## 2023-02-24 RX ADMIN — LOSARTAN POTASSIUM 25 MILLIGRAM(S): 100 TABLET, FILM COATED ORAL at 05:47

## 2023-02-24 RX ADMIN — Medication 1 TABLET(S): at 12:49

## 2023-02-24 RX ADMIN — RIVAROXABAN 10 MILLIGRAM(S): KIT at 12:48

## 2023-02-24 RX ADMIN — CARVEDILOL PHOSPHATE 6.25 MILLIGRAM(S): 80 CAPSULE, EXTENDED RELEASE ORAL at 17:28

## 2023-02-24 RX ADMIN — Medication 6 UNIT(S): at 08:45

## 2023-02-24 RX ADMIN — Medication 81 MILLIGRAM(S): at 12:48

## 2023-02-24 RX ADMIN — Medication 10 UNIT(S): at 17:28

## 2023-02-24 RX ADMIN — Medication 3 MILLILITER(S): at 23:28

## 2023-02-24 RX ADMIN — Medication 2: at 17:28

## 2023-02-24 RX ADMIN — Medication 3 MILLILITER(S): at 12:49

## 2023-02-24 RX ADMIN — Medication 10 UNIT(S): at 12:51

## 2023-02-24 RX ADMIN — BUDESONIDE AND FORMOTEROL FUMARATE DIHYDRATE 2 PUFF(S): 160; 4.5 AEROSOL RESPIRATORY (INHALATION) at 06:04

## 2023-02-24 RX ADMIN — Medication 40 MILLIGRAM(S): at 05:47

## 2023-02-24 RX ADMIN — Medication 2: at 12:51

## 2023-02-24 NOTE — PROGRESS NOTE ADULT - PROBLEM SELECTOR PLAN 1
Will increase Lantus to 26 units at bed time.  Will increase Admelog to 10 units before each meal in addition to Admelog correction scale coverage.  Will continue monitoring FS, log, and glucose trends, will Follow up.  Patient counseled for compliance with consistent low carb diet and exercise as tolerated outpatient.

## 2023-02-24 NOTE — PROGRESS NOTE ADULT - TIME BILLING
Patient seen and examined.  Agree with above PA note.  cv stable  cont tx per primary team   can check echo, can be done as outpt
patient seen and examined.  Agree with above PA note.  cv stable  cont current tx  supportive care  dcp per primary team

## 2023-02-25 ENCOUNTER — TRANSCRIPTION ENCOUNTER (OUTPATIENT)
Age: 73
End: 2023-02-25

## 2023-02-25 VITALS
OXYGEN SATURATION: 95 % | DIASTOLIC BLOOD PRESSURE: 71 MMHG | SYSTOLIC BLOOD PRESSURE: 116 MMHG | HEART RATE: 73 BPM | RESPIRATION RATE: 20 BRPM | TEMPERATURE: 98 F

## 2023-02-25 LAB
GLUCOSE BLDC GLUCOMTR-MCNC: 165 MG/DL — HIGH (ref 70–99)
GLUCOSE BLDC GLUCOMTR-MCNC: 191 MG/DL — HIGH (ref 70–99)

## 2023-02-25 PROCEDURE — 36415 COLL VENOUS BLD VENIPUNCTURE: CPT

## 2023-02-25 PROCEDURE — 83605 ASSAY OF LACTIC ACID: CPT

## 2023-02-25 PROCEDURE — 84443 ASSAY THYROID STIM HORMONE: CPT

## 2023-02-25 PROCEDURE — 83880 ASSAY OF NATRIURETIC PEPTIDE: CPT

## 2023-02-25 PROCEDURE — 84145 PROCALCITONIN (PCT): CPT

## 2023-02-25 PROCEDURE — 99285 EMERGENCY DEPT VISIT HI MDM: CPT

## 2023-02-25 PROCEDURE — 71045 X-RAY EXAM CHEST 1 VIEW: CPT

## 2023-02-25 PROCEDURE — 82435 ASSAY OF BLOOD CHLORIDE: CPT

## 2023-02-25 PROCEDURE — 96374 THER/PROPH/DIAG INJ IV PUSH: CPT

## 2023-02-25 PROCEDURE — 85025 COMPLETE CBC W/AUTO DIFF WBC: CPT

## 2023-02-25 PROCEDURE — 80053 COMPREHEN METABOLIC PANEL: CPT

## 2023-02-25 PROCEDURE — 85018 HEMOGLOBIN: CPT

## 2023-02-25 PROCEDURE — 86803 HEPATITIS C AB TEST: CPT

## 2023-02-25 PROCEDURE — 84295 ASSAY OF SERUM SODIUM: CPT

## 2023-02-25 PROCEDURE — 82330 ASSAY OF CALCIUM: CPT

## 2023-02-25 PROCEDURE — 80048 BASIC METABOLIC PNL TOTAL CA: CPT

## 2023-02-25 PROCEDURE — 82962 GLUCOSE BLOOD TEST: CPT

## 2023-02-25 PROCEDURE — 84484 ASSAY OF TROPONIN QUANT: CPT

## 2023-02-25 PROCEDURE — 84439 ASSAY OF FREE THYROXINE: CPT

## 2023-02-25 PROCEDURE — 82803 BLOOD GASES ANY COMBINATION: CPT

## 2023-02-25 PROCEDURE — 0225U NFCT DS DNA&RNA 21 SARSCOV2: CPT

## 2023-02-25 PROCEDURE — 85014 HEMATOCRIT: CPT

## 2023-02-25 PROCEDURE — 94640 AIRWAY INHALATION TREATMENT: CPT

## 2023-02-25 PROCEDURE — 83036 HEMOGLOBIN GLYCOSYLATED A1C: CPT

## 2023-02-25 PROCEDURE — 82947 ASSAY GLUCOSE BLOOD QUANT: CPT

## 2023-02-25 PROCEDURE — 85027 COMPLETE CBC AUTOMATED: CPT

## 2023-02-25 PROCEDURE — 84132 ASSAY OF SERUM POTASSIUM: CPT

## 2023-02-25 PROCEDURE — 71250 CT THORAX DX C-: CPT

## 2023-02-25 RX ORDER — GLIMEPIRIDE 1 MG
1 TABLET ORAL
Qty: 30 | Refills: 0
Start: 2023-02-25 | End: 2023-03-26

## 2023-02-25 RX ORDER — BUDESONIDE AND FORMOTEROL FUMARATE DIHYDRATE 160; 4.5 UG/1; UG/1
2 AEROSOL RESPIRATORY (INHALATION)
Qty: 1 | Refills: 0
Start: 2023-02-25

## 2023-02-25 RX ADMIN — RIVAROXABAN 10 MILLIGRAM(S): KIT at 14:37

## 2023-02-25 RX ADMIN — CARVEDILOL PHOSPHATE 6.25 MILLIGRAM(S): 80 CAPSULE, EXTENDED RELEASE ORAL at 05:00

## 2023-02-25 RX ADMIN — Medication 1: at 09:01

## 2023-02-25 RX ADMIN — Medication 100 MICROGRAM(S): at 05:00

## 2023-02-25 RX ADMIN — LOSARTAN POTASSIUM 25 MILLIGRAM(S): 100 TABLET, FILM COATED ORAL at 05:00

## 2023-02-25 RX ADMIN — Medication 3 MILLILITER(S): at 05:02

## 2023-02-25 RX ADMIN — Medication 10 UNIT(S): at 09:02

## 2023-02-25 RX ADMIN — Medication 40 MILLIGRAM(S): at 05:00

## 2023-02-25 RX ADMIN — Medication 81 MILLIGRAM(S): at 14:38

## 2023-02-25 RX ADMIN — Medication 3 MILLILITER(S): at 14:37

## 2023-02-25 RX ADMIN — Medication 1 TABLET(S): at 14:38

## 2023-02-25 NOTE — PROGRESS NOTE ADULT - PROVIDER SPECIALTY LIST ADULT
Internal Medicine
Pulmonology
Cardiology
Internal Medicine
Pulmonology
Internal Medicine
Pulmonology
Endocrinology

## 2023-02-25 NOTE — DISCHARGE NOTE NURSING/CASE MANAGEMENT/SOCIAL WORK - NSDCPEFALRISK_GEN_ALL_CORE
For information on Fall & Injury Prevention, visit: [email or web address] OR  [email or web address] OR  https://www.cdc.gov/steadi/patient.html

## 2023-02-25 NOTE — DISCHARGE NOTE NURSING/CASE MANAGEMENT/SOCIAL WORK - PATIENT PORTAL LINK FT
You can access the FollowMyHealth Patient Portal offered by [facility] by registering at the following website: [email or web address]. By joining [facility]’s FollowMyHealth portal, you will also be able to view your health information using other applications (apps) compatible with our system.

## 2023-02-25 NOTE — PROGRESS NOTE ADULT - ASSESSMENT
71 yo M former smoker CAD(stent), hld, htn, dm, and MI presenting with sob x 6 days. As per daughter, patient's wife also sick. Patient has been having sob and productive cough. Symptoms worsened the past two days. Patient went to PCP and found to be hypoxic to 90. Patient denies chest pain, fever, abd pain, nvd, dysuria and back pain.:    he has  no underlying lung disease:  he is an e x smoker:   now he is found to ahve hMPV infection:  he is sob and has cough with wheezing and is on 2 L of oxygen  :      hMPV induced wheezing:  cxr is clear:   ex smoker:   CAD:  htn:  dm:    2/22:    hMPV induced wheezing:  cxr is clear:  ADD STEROIDS  HIS PH IS PRETTY GOOD:  NO SOB AT REST:  WHEEZING:  RECED STEROIDS IN ER:  CONT STEROIDS  : AND bd :  CXR IS CLEAR:  NO need for antibiotics  check procal    ex smoker: would probably need ct chest on this admission  CAD: cont current care:   htn: controlled  dm: Monitor and control :    dvt prophylaxis    [facility]     2/23:    hMPV induced wheezing:  cxr is clear:  ADD STEROIDS  HIS PH IS PRETTY GOOD:  NO SOB AT REST:  WHEEZING:  RECED STEROIDS IN ER:  CONT STEROIDS  : AND bd :  CXR IS CLEAR:  NO need for antibiotics  : procal is low too : ct chest reviewed" Multifocal clusters of tree-in-bud nodules in the left lower lobe,  related to infectious/inflammatory small airways disease- likely secondary to viral infection  ex smoker: would probably need ct chest on this admission  CAD: cont current care:   htn: controlled  dm: Monitor and control :    dvt prophylaxis    [facility]     2/24:    hMPV induced wheezing:  cxr is clear:  doing [facility] better: try to wean him off o oxygen :  NO need for antibiotics  : procal is low too : ct chest reviewed" Multifocal clusters of tree-in-bud nodules in the left lower lobe,  related to infectious/inflammatory small airways disease- likely secondary to viral infection  ex smoker: ct noted:  has asbestos related pleural  plaque: with tib : no gross consolidation:    CAD: cont current care:   htn: controlled  dm: Monitor and control :    dvt prophylaxis: dcplanning    dw acp     2/25:    hMPV induced wheezing:  cxr is clear:  doing [facility] better: try to wean him off o oxygen :  NO need for antibiotics  : procal is low too : ct chest reviewed" Multifocal clusters of tree-in-bud nodules in the left lower lobe,  related to infectious/inflammatory small airways disease- likely secondary to viral infection : rpt ct chest  in 6 weeks time:   ex smoker: ct noted:  has asbestos related pleural  plaque: with tib : no gross consolidation:    CAD: cont current care:   htn: controlled  dm: Monitor and control :    dvt prophylaxis: dc planning    dw acp 
A/P  71 yo M former smoker CAD(stent), hld, htn, dm, and MI presenting with sob x 6 days.     #acute hypoxic resp failure  -hpmv  -continue supportive care  -med/pulmonary f/u  -no decomp HF  -To go home with supp O2    #CAD, s/p PCI  -cv stable  -no acs  -asa, atorvastatin  -Can check echo, can also be done as outpt    #HTN  -Cont coreg  -Cont losartan    dvt ppx    35 minutes spent on total encounter; more than 50% of the visit was spent counseling and/or coordinating care by the attending physician.      
A/P  73 yo M former smoker CAD(stent), hld, htn, dm, and MI presenting with sob x 6 days.     #acute hypoxic resp failure  -hpmv  -continue supportive care  -med/pulmonary f/u  -no decomp HF    #CAD, s/p PCI  -cv stable  -no acs  -asa, atorvastatin    #HTN  -Cont coreg  -Cont losartan    dvt ppx
71 yo M former smoker CAD(stent), hld, htn, dm, and MI presenting with sob x 6 days. As per daughter, patient's wife also sick. Patient has been having sob and productive cough. Symptoms worsened the past two days. Patient went to PCP and found to be hypoxic to 90. Patient denies chest pain, fever, abd pain, nvd, dysuria and back pain. ptn was seen by Pulm and Cardiology  Ptn has  no underlying lung disease:  he is an ex smoker,   acute Tracheobronchitis and PNA 2/2  HMPV infection with sob  cough and wheezing requiring 2 L O2NC   feeling much better, check pulse ox on RA, may need home O2, start on Po prednisone for 5 days, has viral PNA on Ct chest  DVT ppx w po Xarelto      
71 yo M former smoker CAD(stent), hld, htn, dm, and MI presenting with sob x 6 days. As per daughter, patient's wife also sick. Patient has been having sob and productive cough. Symptoms worsened the past two days. Patient went to PCP and found to be hypoxic to 90. Patient denies chest pain, fever, abd pain, nvd, dysuria and back pain.:    he has  no underlying lung disease:  he is an e x smoker:   now he is found to ahve hMPV infection:  he is sob and has cough with wheezing and is on 2 L of oxygen  :      hMPV induced wheezing:  cxr is clear:   ex smoker:   CAD:  htn:  dm:    2/22:    hMPV induced wheezing:  cxr is clear:  ADD STEROIDS  HIS PH IS PRETTY GOOD:  NO SOB AT REST:  WHEEZING:  RECED STEROIDS IN ER:  CONT STEROIDS  : AND bd :  CXR IS CLEAR:  NO need for antibiotics  check procal    ex smoker: would probably need ct chest on this admission  CAD: cont current care:   htn: controlled  dm: Monitor and control :    dvt prophylaxis    dw acp     2/23:    hMPV induced wheezing:  cxr is clear:  ADD STEROIDS  HIS PH IS PRETTY GOOD:  NO SOB AT REST:  WHEEZING:  RECED STEROIDS IN ER:  CONT STEROIDS  : AND bd :  CXR IS CLEAR:  NO need for antibiotics  : procal is low too : ct chest reviewed" Multifocal clusters of tree-in-bud nodules in the left lower lobe,  related to infectious/inflammatory small airways disease- likely secondary to viral infection  ex smoker: would probably need ct chest on this admission  CAD: cont current care:   htn: controlled  dm: Monitor and control :    dvt prophylaxis    dw acp     2/24:    hMPV induced wheezing:  cxr is clear:  doing [facility] better: try to wean him off o oxygen :  NO need for antibiotics  : procal is low too : ct chest reviewed" Multifocal clusters of tree-in-bud nodules in the left lower lobe,  related to infectious/inflammatory small airways disease- likely secondary to viral infection  ex smoker: ct noted:  has asbestos related pleural  plaque: with tib : no gross consolidation:    CAD: cont current care:   htn: controlled  dm: Monitor and control :    dvt prophylaxis: dcplanning    dw acp 
73 yo M former smoker CAD(stent), hld, htn, dm, and MI presenting with sob x 6 days. As per daughter, patient's wife also sick. Patient has been having sob and productive cough. Symptoms worsened the past two days. Patient went to PCP and found to be hypoxic to 90. Patient denies chest pain, fever, abd pain, nvd, dysuria and back pain.:    he has  no underlying lung disease:  he is an e x smoker:   now he is found to ahve hMPV infection:  he is sob and has cough with wheezing and is on 2 L of oxygen  :      hMPV induced wheezing:  cxr is clear:   ex smoker:   CAD:  htn:  dm:    2/22:    hMPV induced wheezing:  cxr is clear:  ADD STEROIDS  HIS PH IS PRETTY GOOD:  NO SOB AT REST:  WHEEZING:  RECED STEROIDS IN ER:  CONT STEROIDS  : AND bd :  CXR IS CLEAR:  NO need for antibiotics  check procal    ex smoker: would probably need ct chest on this admission  CAD: cont current care:   htn: controlled  dm: Monitor and control :    dvt prophylaxis    dw acp     2/23:    hMPV induced wheezing:  cxr is clear:  ADD STEROIDS  HIS PH IS PRETTY GOOD:  NO SOB AT REST:  WHEEZING:  RECED STEROIDS IN ER:  CONT STEROIDS  : AND bd :  CXR IS CLEAR:  NO need for antibiotics  : procal is low too : ct chest reviewed" Multifocal clusters of tree-in-bud nodules in the left lower lobe,  related to infectious/inflammatory small airways disease- likely secondary to viral infection  ex smoker: would probably need ct chest on this admission  CAD: cont current care:   htn: controlled  dm: Monitor and control :    dvt prophylaxis    dw acp 
71 yo M former smoker CAD(stent), hld, htn, dm, and MI presenting with sob x 6 days. As per daughter, patient's wife also sick. Patient has been having sob and productive cough. Symptoms worsened the past two days. Patient went to PCP and found to be hypoxic to 90. Patient denies chest pain, fever, abd pain, nvd, dysuria and back pain. ptn was seen by Pulm and Cardiology  Ptn has  no underlying lung disease:  he is an ex smoker,   acute Tracheobronchitis and viral PNA 2/2  HMPV infection with sob  cough and wheezing requiring 2 L O2NC   feeling much better, needs home O2, desats to 87%  cont  on Po prednisone for 5 days,   elevated FS, ENdo following  DVT ppx w po Xarelto      
73 yo M former smoker CAD(stent), hld, htn, dm, and MI presenting with sob x 6 days. As per daughter, patient's wife also sick. Patient has been having sob and productive cough. Symptoms worsened the past two days. Patient went to PCP and found to be hypoxic to 90. Patient denies chest pain, fever, abd pain, nvd, dysuria and back pain. ptn was seen by Pulm and Cardiology  Ptn has  no underlying lung disease:  he is an ex smoker,   acute Tracheobronchitis and viral PNA 2/2  HMPV infection with sob  cough and wheezing requiring 2 L O2NC   feeling much better, needs home O2, desats to 87%  cont  on Po prednisone for 5 days,   elevated FS, ENdo recs prior to DC  DVT ppx w po Xarelto      
A/P  73 yo M former smoker CAD(stent), hld, htn, dm, and MI presenting with sob x 6 days.     #acute hypoxic resp failure  -hpmv  -continue supportive care  -med/pulmonary f/u  -no decomp HF  -To go home with supp O2    #CAD, s/p PCI  -cv stable  -no acs  -asa, atorvastatin  -Can check echo, can also be done as outpt    #HTN  -Cont coreg  -Cont losartan    dvt ppx      
Assessment  DMT2: 72y Male with DM T2 with hyperglycemia on insulin, blood sugars running high, in acceptable range, improving, fluctuating, had no hypoglycemic episode,  eating meals, started on basal and bolus insulin, post prandial glucose elevations while on steroid therapy,   non compliant with low carb diet.  CAD: On medications, stable, monitored.  Hypothyroidism:  On synthroid  100 mcg po daily, asymptomatic, TFT Euthyroid.  HTN: Controlled, On med.  Pneumonia: On RX F/U by PCP      Dr Hanks  [phone]

## 2023-02-25 NOTE — PROGRESS NOTE ADULT - SUBJECTIVE AND OBJECTIVE BOX
Chief complaint  Patient is a 72y old  Male who presents with a chief complaint of PNA (23 Feb 2023 18:57)         Labs and Fingersticks  CAPILLARY BLOOD GLUCOSE      POCT Blood Glucose.: 250 mg/dL (24 Feb 2023 12:13)  POCT Blood Glucose.: 193 mg/dL (24 Feb 2023 08:22)  POCT Blood Glucose.: 252 mg/dL (23 Feb 2023 21:04)  POCT Blood Glucose.: 194 mg/dL (23 Feb 2023 17:05)      Anion Gap, Serum: 19 *H* (02-23 @ 10:10)      Calcium, Total Serum: 9.1 (02-23 @ 10:10)          02-23    141  |  100  |  30<H>  ----------------------------<  263<H>  4.2   |  22  |  0.76    Ca    9.1      23 Feb 2023 10:10                          14.9   9.00  )-----------( 160      ( 23 Feb 2023 10:10 )             45.7     Medications  MEDICATIONS  (STANDING):  albuterol/ipratropium for Nebulization 3 milliLiter(s) Nebulizer every 6 hours  aspirin enteric coated 81 milliGRAM(s) Oral daily  atorvastatin 40 milliGRAM(s) Oral at bedtime  budesonide 160 MICROgram(s)/formoterol 4.5 MICROgram(s) Inhaler 2 Puff(s) Inhalation two times a day  carvedilol 6.25 milliGRAM(s) Oral every 12 hours  dextrose 5%. 1000 milliLiter(s) (50 mL/Hr) IV Continuous <Continuous>  dextrose 5%. 1000 milliLiter(s) (100 mL/Hr) IV Continuous <Continuous>  dextrose 50% Injectable 25 Gram(s) IV Push once  dextrose 50% Injectable 12.5 Gram(s) IV Push once  dextrose 50% Injectable 25 Gram(s) IV Push once  glucagon  Injectable 1 milliGRAM(s) IntraMuscular once  insulin glargine Injectable (LANTUS) 26 Unit(s) SubCutaneous at bedtime  insulin lispro (ADMELOG) corrective regimen sliding scale   SubCutaneous three times a day before meals  insulin lispro Injectable (ADMELOG) 10 Unit(s) SubCutaneous three times a day before meals  insulin lispro Injectable (ADMELOG) 10 Unit(s) SubCutaneous once  levothyroxine 100 MICROGram(s) Oral daily  losartan 25 milliGRAM(s) Oral daily  multivitamin 1 Tablet(s) Oral daily  predniSONE   Tablet 40 milliGRAM(s) Oral daily  rivaroxaban 10 milliGRAM(s) Oral daily      Physical Exam  General: Patient comfortable in bed   Vital Signs Last 12 Hrs  T(F): 98.1 (02-24-23 @ 09:18), Max: 98.1 (02-24-23 @ 09:18)  HR: 72 (02-24-23 @ 09:18) (72 - 74)  BP: 121/72 (02-24-23 @ 09:18) (114/69 - 121/72)  BP(mean): --  RR: 20 (02-24-23 @ 09:18) (19 - 20)  SpO2: 91% (02-24-23 @ 09:18) (91% - 94%)    CVS: S1S2   Respiratory: No wheezing, no crepitations  GI: Abdomen soft, bowel sounds positive  Musculoskeletal:  moves all extremities  : Voiding     
CARDIOLOGY FOLLOW UP - Dr. Rose  DATE OF SERVICE: 2/24/23    CC  No CV complaints    REVIEW OF SYSTEMS:  CONSTITUTIONAL: No fever, weight loss, or fatigue  RESPIRATORY: No cough, wheezing, chills or hemoptysis; No Shortness of Breath  CARDIOVASCULAR: No chest pain, palpitations, passing out, dizziness, or leg swelling  GASTROINTESTINAL: No abdominal or epigastric pain. No nausea, vomiting, or hematemesis; No diarrhea or constipation. No melena or hematochezia.  VASCULAR: No edema     PHYSICAL EXAM:  T(C): 36.7 (02-24-23 @ 09:18), Max: 36.7 (02-24-23 @ 09:18)  HR: 72 (02-24-23 @ 09:18) (72 - 83)  BP: 121/72 (02-24-23 @ 09:18) (107/66 - 121/72)  RR: 20 (02-24-23 @ 09:18) (19 - 20)  SpO2: 91% (02-24-23 @ 09:18) (87% - 94%)  Wt(kg): --  I&O's Summary    23 Feb 2023 07:01  -  24 Feb 2023 07:00  --------------------------------------------------------  IN: 360 mL / OUT: 0 mL / NET: 360 mL        Appearance: Normal	  Cardiovascular: Normal S1 S2,RRR, No JVD, No murmurs  Respiratory: Lungs clear to auscultation b/l	  Gastrointestinal:  Soft, Non-tender, + BS	  Extremities: Normal range of motion, No clubbing, cyanosis or edema      Home Medications:  aspirin 81 mg oral delayed release tablet: 1 tab(s) orally once a day (22 Feb 2023 16:55)  carvedilol 6.25 mg oral tablet: 1 tab(s) orally 2 times a day (22 Feb 2023 16:55)  Jardiance 25 mg oral tablet: 1 tab(s) orally once a day (in the morning) (22 Feb 2023 16:55)  levothyroxine 100 mcg (0.1 mg) oral tablet: 1 tab(s) orally once a day (22 Feb 2023 16:55)  losartan 25 mg oral tablet: 1 tab(s) orally once a day (22 Feb 2023 16:55)  metFORMIN 500 mg oral tablet, extended release: 1 tab(s) orally 2 times a day (22 Feb 2023 16:55)  multivitamin: 1 tab(s) orally once a day (22 Feb 2023 16:55)  simvastatin 80 mg oral tablet: 1 tab(s) orally once a day (at bedtime) (22 Feb 2023 16:55)      MEDICATIONS  (STANDING):  albuterol/ipratropium for Nebulization 3 milliLiter(s) Nebulizer every 6 hours  aspirin enteric coated 81 milliGRAM(s) Oral daily  atorvastatin 40 milliGRAM(s) Oral at bedtime  budesonide 160 MICROgram(s)/formoterol 4.5 MICROgram(s) Inhaler 2 Puff(s) Inhalation two times a day  carvedilol 6.25 milliGRAM(s) Oral every 12 hours  dextrose 5%. 1000 milliLiter(s) (50 mL/Hr) IV Continuous <Continuous>  dextrose 5%. 1000 milliLiter(s) (100 mL/Hr) IV Continuous <Continuous>  dextrose 50% Injectable 25 Gram(s) IV Push once  dextrose 50% Injectable 12.5 Gram(s) IV Push once  dextrose 50% Injectable 25 Gram(s) IV Push once  glucagon  Injectable 1 milliGRAM(s) IntraMuscular once  insulin glargine Injectable (LANTUS) 18 Unit(s) SubCutaneous at bedtime  insulin lispro (ADMELOG) corrective regimen sliding scale   SubCutaneous three times a day before meals  insulin lispro Injectable (ADMELOG) 6 Unit(s) SubCutaneous three times a day before meals  levothyroxine 100 MICROGram(s) Oral daily  losartan 25 milliGRAM(s) Oral daily  multivitamin 1 Tablet(s) Oral daily  predniSONE   Tablet 40 milliGRAM(s) Oral daily  rivaroxaban 10 milliGRAM(s) Oral daily      TELEMETRY: 	    ECG:  	  RADIOLOGY:   DIAGNOSTIC TESTING:  [ ] Echocardiogram:  [ ]  Catheterization:  [ ] Stress Test:    OTHER: 	    LABS:	 	    Troponin T, High Sensitivity Result: 13 ng/L [0 - 51] (02-22 @ 15:24)  Troponin T, High Sensitivity Result: 15 ng/L [0 - 51] (02-22 @ 12:15)                          14.9   9.00  )-----------( 160      ( 23 Feb 2023 10:10 )             45.7     02-23    141  |  100  |  30<H>  ----------------------------<  263<H>  4.2   |  22  |  0.76    Ca    9.1      23 Feb 2023 10:10    TPro  7.5  /  Alb  4.3  /  TBili  0.6  /  DBili  x   /  AST  23  /  ALT  20  /  AlkPhos  58  02-22            
Date of Service: 02-23-23 @ 13:07    Patient is a 72y old  Male who presents with a chief complaint of SOB (22 Feb 2023 15:47)      Any change in ROS: seems OK: on 2 L: not much of wheezing    MEDICATIONS  (STANDING):  albuterol/ipratropium for Nebulization 3 milliLiter(s) Nebulizer every 6 hours  aspirin enteric coated 81 milliGRAM(s) Oral daily  atorvastatin 40 milliGRAM(s) Oral at bedtime  budesonide 160 MICROgram(s)/formoterol 4.5 MICROgram(s) Inhaler 2 Puff(s) Inhalation two times a day  carvedilol 6.25 milliGRAM(s) Oral every 12 hours  dextrose 5%. 1000 milliLiter(s) (50 mL/Hr) IV Continuous <Continuous>  dextrose 5%. 1000 milliLiter(s) (100 mL/Hr) IV Continuous <Continuous>  dextrose 50% Injectable 25 Gram(s) IV Push once  dextrose 50% Injectable 12.5 Gram(s) IV Push once  dextrose 50% Injectable 25 Gram(s) IV Push once  glucagon  Injectable 1 milliGRAM(s) IntraMuscular once  insulin lispro (ADMELOG) corrective regimen sliding scale   SubCutaneous three times a day before meals  levothyroxine 100 MICROGram(s) Oral daily  losartan 25 milliGRAM(s) Oral daily  multivitamin 1 Tablet(s) Oral daily  predniSONE   Tablet 40 milliGRAM(s) Oral daily  rivaroxaban 10 milliGRAM(s) Oral daily    MEDICATIONS  (PRN):  dextrose Oral Gel 15 Gram(s) Oral once PRN Blood Glucose LESS THAN 70 milliGRAM(s)/deciliter    Vital Signs Last 24 Hrs  T(C): 36.6 (23 Feb 2023 12:59), Max: 37.1 (22 Feb 2023 18:43)  T(F): 97.8 (23 Feb 2023 12:59), Max: 98.8 (22 Feb 2023 18:43)  HR: 78 (23 Feb 2023 12:59) (78 - 88)  BP: 118/79 (23 Feb 2023 12:59) (103/64 - 131/66)  BP(mean): --  RR: 20 (23 Feb 2023 12:59) (20 - 21)  SpO2: 92% (23 Feb 2023 12:59) (92% - 97%)    Parameters below as of 23 Feb 2023 12:59  Patient On (Oxygen Delivery Method): nasal cannula  O2 Flow (L/min): 2      I&O's Summary    22 Feb 2023 07:01  -  23 Feb 2023 07:00  --------------------------------------------------------  IN: 360 mL / OUT: 0 mL / NET: 360 mL    23 Feb 2023 07:01  -  23 Feb 2023 13:07  --------------------------------------------------------  IN: 240 mL / OUT: 0 mL / NET: 240 mL          Physical Exam:   GENERAL: NAD, well-groomed, well-developed  HEENT: VALDO/   Atraumatic, Normocephalic  ENMT: No tonsillar erythema, exudates, or enlargement; Moist mucous membranes, Good dentition, No lesions  NECK: Supple, No JVD, Normal thyroid  CHEST/LUNG: Clear to auscultaion- not much of wheezing  CVS: Regular rate and rhythm; No murmurs, rubs, or gallops  GI: : Soft, Nontender, Nondistended; Bowel sounds present  NERVOUS SYSTEM:  Alert & Oriented X3  EXTREMITIES:  2+ Peripheral Pulses, No clubbing, cyanosis, or edema  LYMPH: No lymphadenopathy noted  SKIN: No rashes or lesions  ENDOCRINOLOGY: No Thyromegaly  PSYCH: Appropriate    Labs:  31                            14.9   9.00  )-----------( 160      ( 23 Feb 2023 10:10 )             45.7                         15.1   8.05  )-----------( 162      ( 22 Feb 2023 12:15 )             47.3     02-23    141  |  100  |  30<H>  ----------------------------<  263<H>  4.2   |  22  |  0.76  02-22    138  |  99  |  13  ----------------------------<  160<H>  4.1   |  24  |  0.87    Ca    9.1      23 Feb 2023 10:10  Ca    9.2      22 Feb 2023 12:15    TPro  7.5  /  Alb  4.3  /  TBili  0.6  /  DBili  x   /  AST  23  /  ALT  20  /  AlkPhos  58  02-22    CAPILLARY BLOOD GLUCOSE      POCT Blood Glucose.: 237 mg/dL (23 Feb 2023 11:45)  POCT Blood Glucose.: 144 mg/dL (23 Feb 2023 08:04)  POCT Blood Glucose.: 199 mg/dL (22 Feb 2023 22:27)  POCT Blood Glucose.: 223 mg/dL (22 Feb 2023 21:43)      LIVER FUNCTIONS - ( 22 Feb 2023 12:15 )  Alb: 4.3 g/dL / Pro: 7.5 g/dL / ALK PHOS: 58 U/L / ALT: 20 U/L / AST: 23 U/L / GGT: x               Procalcitonin, Serum: 0.07 ng/mL (02-22 @ 15:24)  Serum Pro-Brain Natriuretic Peptide: 244 pg/mL (02-22 @ 12:15)        RECENT CULTURES:      rad< from: CT Chest No Cont (02.23.23 @ 09:12) >  Heart and Vessels: 4.1 cm mid ascending aorta. The heart is normal in   size. Coronary artery calcification and stenting. Subendocardial fatty   metaplasia at the LV apex. No pericardial effusion.    Upper Abdomen: Hepatic steatosis.    Osseous structures and Soft Tissues: No aggressive bone lesions.    IMPRESSION:  Multifocal clusters of tree-in-bud nodules in the left lower lobe,   related to infectious/inflammatory small airways disease.    --- End of Report ---      < end of copied text >    RESPIRATORY CULTURES:          Studies  Chest X-RAY  CT SCAN Chest   Venous Dopplers: LE:   CT Abdomen  Others              
Patient is a 72y old  Male who presents with a chief complaint of PNA (24 Feb 2023 12:19)      SUBJECTIVE / OVERNIGHT EVENTS: elevated FS 2/2 steroids, needs home O2, has been delivered dc home once FS under control.     MEDICATIONS  (STANDING):  albuterol/ipratropium for Nebulization 3 milliLiter(s) Nebulizer every 6 hours  aspirin enteric coated 81 milliGRAM(s) Oral daily  atorvastatin 40 milliGRAM(s) Oral at bedtime  budesonide 160 MICROgram(s)/formoterol 4.5 MICROgram(s) Inhaler 2 Puff(s) Inhalation two times a day  carvedilol 6.25 milliGRAM(s) Oral every 12 hours  dextrose 5%. 1000 milliLiter(s) (50 mL/Hr) IV Continuous <Continuous>  dextrose 5%. 1000 milliLiter(s) (100 mL/Hr) IV Continuous <Continuous>  dextrose 50% Injectable 25 Gram(s) IV Push once  dextrose 50% Injectable 12.5 Gram(s) IV Push once  dextrose 50% Injectable 25 Gram(s) IV Push once  glucagon  Injectable 1 milliGRAM(s) IntraMuscular once  insulin glargine Injectable (LANTUS) 26 Unit(s) SubCutaneous at bedtime  insulin lispro (ADMELOG) corrective regimen sliding scale   SubCutaneous three times a day before meals  insulin lispro Injectable (ADMELOG) 10 Unit(s) SubCutaneous three times a day before meals  levothyroxine 100 MICROGram(s) Oral daily  losartan 25 milliGRAM(s) Oral daily  multivitamin 1 Tablet(s) Oral daily  predniSONE   Tablet 40 milliGRAM(s) Oral daily  rivaroxaban 10 milliGRAM(s) Oral daily    MEDICATIONS  (PRN):  dextrose Oral Gel 15 Gram(s) Oral once PRN Blood Glucose LESS THAN 70 milliGRAM(s)/deciliter      Vital Signs Last 24 Hrs  T(F): 97.7 (02-24-23 @ 16:42), Max: 98.6 (02-24-23 @ 12:45)  HR: 82 (02-24-23 @ 16:42) (70 - 83)  BP: 128/74 (02-24-23 @ 16:42) (114/69 - 128/74)  RR: 20 (02-24-23 @ 16:42) (19 - 20)  SpO2: 94% (02-24-23 @ 16:42) (91% - 94%)  Telemetry:   CAPILLARY BLOOD GLUCOSE      POCT Blood Glucose.: 221 mg/dL (24 Feb 2023 17:16)  POCT Blood Glucose.: 250 mg/dL (24 Feb 2023 12:13)  POCT Blood Glucose.: 193 mg/dL (24 Feb 2023 08:22)  POCT Blood Glucose.: 252 mg/dL (23 Feb 2023 21:04)    I&O's Summary    23 Feb 2023 07:01  -  24 Feb 2023 07:00  --------------------------------------------------------  IN: 360 mL / OUT: 0 mL / NET: 360 mL    24 Feb 2023 07:01  -  24 Feb 2023 19:56  --------------------------------------------------------  IN: 720 mL / OUT: 0 mL / NET: 720 mL        PHYSICAL EXAM:  GENERAL: NAD, well-developed  HEAD:  Atraumatic, Normocephalic  EYES: EOMI, PERRLA, conjunctiva and sclera clear  NECK: Supple, No JVD  CHEST/LUNG: Clear to auscultation bilaterally; No wheeze  HEART: Regular rate and rhythm; No murmurs, rubs, or gallops  ABDOMEN: Soft, Nontender, Nondistended; Bowel sounds present  EXTREMITIES:  2+ Peripheral Pulses, No clubbing, cyanosis, or edema  PSYCH: AAOx3  NEUROLOGY: non-focal  SKIN: No rashes or lesions    LABS:                        14.9   9.00  )-----------( 160      ( 23 Feb 2023 10:10 )             45.7     02-23    141  |  100  |  30<H>  ----------------------------<  263<H>  4.2   |  22  |  0.76    Ca    9.1      23 Feb 2023 10:10                RADIOLOGY & ADDITIONAL TESTS:    Imaging Personally Reviewed:    Consultant(s) Notes Reviewed:      Care Discussed with Consultants/Other Providers:  
Patient is a 72y old  Male who presents with a chief complaint of SOB  (23 Feb 2023 15:45)      SUBJECTIVE / OVERNIGHT EVENTS: no new c/o, feeling much better, check pulse ox on RA, may need home O2, start on Po prednisone for 5 days, has viral PNA on Ct chest    MEDICATIONS  (STANDING):  albuterol/ipratropium for Nebulization 3 milliLiter(s) Nebulizer every 6 hours  aspirin enteric coated 81 milliGRAM(s) Oral daily  atorvastatin 40 milliGRAM(s) Oral at bedtime  budesonide 160 MICROgram(s)/formoterol 4.5 MICROgram(s) Inhaler 2 Puff(s) Inhalation two times a day  carvedilol 6.25 milliGRAM(s) Oral every 12 hours  dextrose 5%. 1000 milliLiter(s) (50 mL/Hr) IV Continuous <Continuous>  dextrose 5%. 1000 milliLiter(s) (100 mL/Hr) IV Continuous <Continuous>  dextrose 50% Injectable 25 Gram(s) IV Push once  dextrose 50% Injectable 12.5 Gram(s) IV Push once  dextrose 50% Injectable 25 Gram(s) IV Push once  glucagon  Injectable 1 milliGRAM(s) IntraMuscular once  insulin glargine Injectable (LANTUS) 16 Unit(s) SubCutaneous at bedtime  insulin lispro (ADMELOG) corrective regimen sliding scale   SubCutaneous three times a day before meals  levothyroxine 100 MICROGram(s) Oral daily  losartan 25 milliGRAM(s) Oral daily  multivitamin 1 Tablet(s) Oral daily  predniSONE   Tablet 40 milliGRAM(s) Oral daily  rivaroxaban 10 milliGRAM(s) Oral daily    MEDICATIONS  (PRN):  dextrose Oral Gel 15 Gram(s) Oral once PRN Blood Glucose LESS THAN 70 milliGRAM(s)/deciliter      Vital Signs Last 24 Hrs  T(F): 97.8 (02-23-23 @ 12:59), Max: 98.8 (02-22-23 @ 18:43)  HR: 78 (02-23-23 @ 12:59) (78 - 88)  BP: 118/79 (02-23-23 @ 12:59) (103/64 - 129/77)  RR: 20 (02-23-23 @ 15:45) (20 - 20)  SpO2: 93% (02-23-23 @ 15:45) (87% - 97%)  Telemetry:   CAPILLARY BLOOD GLUCOSE      POCT Blood Glucose.: 237 mg/dL (23 Feb 2023 11:45)  POCT Blood Glucose.: 144 mg/dL (23 Feb 2023 08:04)  POCT Blood Glucose.: 199 mg/dL (22 Feb 2023 22:27)  POCT Blood Glucose.: 223 mg/dL (22 Feb 2023 21:43)    I&O's Summary    22 Feb 2023 07:01  -  23 Feb 2023 07:00  --------------------------------------------------------  IN: 360 mL / OUT: 0 mL / NET: 360 mL    23 Feb 2023 07:01  -  23 Feb 2023 16:58  --------------------------------------------------------  IN: 240 mL / OUT: 0 mL / NET: 240 mL        PHYSICAL EXAM:  GENERAL: NAD, well-developed  HEAD:  Atraumatic, Normocephalic  EYES: EOMI, PERRLA, conjunctiva and sclera clear  NECK: Supple, No JVD  CHEST/LUNG: Clear to auscultation bilaterally; No wheeze  HEART: Regular rate and rhythm; No murmurs, rubs, or gallops  ABDOMEN: Soft, Nontender, Nondistended; Bowel sounds present  EXTREMITIES:  2+ Peripheral Pulses, No clubbing, cyanosis, or edema  PSYCH: AAOx3  NEUROLOGY: non-focal  SKIN: No rashes or lesions    LABS:                        14.9   9.00  )-----------( 160      ( 23 Feb 2023 10:10 )             45.7     02-23    141  |  100  |  30<H>  ----------------------------<  263<H>  4.2   |  22  |  0.76    Ca    9.1      23 Feb 2023 10:10    TPro  7.5  /  Alb  4.3  /  TBili  0.6  /  DBili  x   /  AST  23  /  ALT  20  /  AlkPhos  58  02-22              RADIOLOGY & ADDITIONAL TESTS:    Imaging Personally Reviewed:    Consultant(s) Notes Reviewed:      Care Discussed with Consultants/Other Providers:  
Patient is a 72y old  Male who presents with a chief complaint of PNA (24 Feb 2023 12:19)      SUBJECTIVE / OVERNIGHT EVENTS: ptn was running high FS while on steroids, medically cleared for DC home today, will need ENDO recs for DM management while on steroids. will need outptn F/U    MEDICATIONS  (STANDING):  albuterol/ipratropium for Nebulization 3 milliLiter(s) Nebulizer every 6 hours  aspirin enteric coated 81 milliGRAM(s) Oral daily  atorvastatin 40 milliGRAM(s) Oral at bedtime  budesonide 160 MICROgram(s)/formoterol 4.5 MICROgram(s) Inhaler 2 Puff(s) Inhalation two times a day  carvedilol 6.25 milliGRAM(s) Oral every 12 hours  dextrose 5%. 1000 milliLiter(s) (50 mL/Hr) IV Continuous <Continuous>  dextrose 5%. 1000 milliLiter(s) (100 mL/Hr) IV Continuous <Continuous>  dextrose 50% Injectable 25 Gram(s) IV Push once  dextrose 50% Injectable 12.5 Gram(s) IV Push once  dextrose 50% Injectable 25 Gram(s) IV Push once  glucagon  Injectable 1 milliGRAM(s) IntraMuscular once  insulin glargine Injectable (LANTUS) 26 Unit(s) SubCutaneous at bedtime  insulin lispro (ADMELOG) corrective regimen sliding scale   SubCutaneous three times a day before meals  insulin lispro Injectable (ADMELOG) 10 Unit(s) SubCutaneous three times a day before meals  levothyroxine 100 MICROGram(s) Oral daily  losartan 25 milliGRAM(s) Oral daily  multivitamin 1 Tablet(s) Oral daily  predniSONE   Tablet 40 milliGRAM(s) Oral daily  rivaroxaban 10 milliGRAM(s) Oral daily    MEDICATIONS  (PRN):  dextrose Oral Gel 15 Gram(s) Oral once PRN Blood Glucose LESS THAN 70 milliGRAM(s)/deciliter      Vital Signs Last 24 Hrs  T(F): 97.6 (02-25-23 @ 11:38), Max: 98.4 (02-25-23 @ 05:06)  HR: 73 (02-25-23 @ 11:38) (69 - 82)  BP: 116/71 (02-25-23 @ 11:38) (116/71 - 128/74)  RR: 20 (02-25-23 @ 11:38) (20 - 20)  SpO2: 95% (02-25-23 @ 11:38) (94% - 95%)  Telemetry:   CAPILLARY BLOOD GLUCOSE      POCT Blood Glucose.: 191 mg/dL (25 Feb 2023 12:08)  POCT Blood Glucose.: 165 mg/dL (25 Feb 2023 08:28)  POCT Blood Glucose.: 177 mg/dL (24 Feb 2023 21:50)  POCT Blood Glucose.: 221 mg/dL (24 Feb 2023 17:16)    I&O's Summary    24 Feb 2023 07:01  -  25 Feb 2023 07:00  --------------------------------------------------------  IN: 960 mL / OUT: 0 mL / NET: 960 mL    25 Feb 2023 07:01  -  25 Feb 2023 13:59  --------------------------------------------------------  IN: 240 mL / OUT: 0 mL / NET: 240 mL        PHYSICAL EXAM:  GENERAL: NAD, well-developed  HEAD:  Atraumatic, Normocephalic  EYES: EOMI, PERRLA, conjunctiva and sclera clear  NECK: Supple, No JVD  CHEST/LUNG: Clear to auscultation bilaterally; No wheeze  HEART: Regular rate and rhythm; No murmurs, rubs, or gallops  ABDOMEN: Soft, Nontender, Nondistended; Bowel sounds present  EXTREMITIES:  2+ Peripheral Pulses, No clubbing, cyanosis, or edema  PSYCH: AAOx3  NEUROLOGY: non-focal  SKIN: No rashes or lesions    LABS:                    RADIOLOGY & ADDITIONAL TESTS:    Imaging Personally Reviewed:    Consultant(s) Notes Reviewed:      Care Discussed with Consultants/Other Providers:  
CARDIOLOGY FOLLOW UP - Dr. Rose  DATE OF SERVICE: 2/23/23    CC  No CV complaints, feeling better. Denies cp, sob    REVIEW OF SYSTEMS:  CONSTITUTIONAL: No fever, weight loss, or fatigue  RESPIRATORY: No cough, wheezing, chills or hemoptysis; No Shortness of Breath  CARDIOVASCULAR: No chest pain, palpitations, passing out, dizziness, or leg swelling  GASTROINTESTINAL: No abdominal or epigastric pain. No nausea, vomiting, or hematemesis; No diarrhea or constipation. No melena or hematochezia.  VASCULAR: No edema     PHYSICAL EXAM:  T(C): 37 (02-23-23 @ 05:30), Max: 37.2 (02-22-23 @ 11:58)  HR: 88 (02-23-23 @ 05:30) (72 - 88)  BP: 103/64 (02-23-23 @ 05:30) (103/64 - 136/73)  RR: 20 (02-23-23 @ 05:30) (19 - 21)  SpO2: 95% (02-23-23 @ 05:30) (93% - 100%)  Wt(kg): --  I&O's Summary    22 Feb 2023 07:01  -  23 Feb 2023 07:00  --------------------------------------------------------  IN: 360 mL / OUT: 0 mL / NET: 360 mL        Appearance: Normal	  Cardiovascular: Normal S1 S2,RRR, No JVD, No murmurs  Respiratory: +Crackles lung bases R>L, otherwise cta b/l	  Gastrointestinal:  Soft, Non-tender, + BS	  Extremities: Normal range of motion, No clubbing, cyanosis or edema      Home Medications:  aspirin 81 mg oral delayed release tablet: 1 tab(s) orally once a day (22 Feb 2023 16:55)  carvedilol 6.25 mg oral tablet: 1 tab(s) orally 2 times a day (22 Feb 2023 16:55)  Jardiance 25 mg oral tablet: 1 tab(s) orally once a day (in the morning) (22 Feb 2023 16:55)  levothyroxine 100 mcg (0.1 mg) oral tablet: 1 tab(s) orally once a day (22 Feb 2023 16:55)  losartan 25 mg oral tablet: 1 tab(s) orally once a day (22 Feb 2023 16:55)  metFORMIN 500 mg oral tablet, extended release: 1 tab(s) orally 2 times a day (22 Feb 2023 16:55)  multivitamin: 1 tab(s) orally once a day (22 Feb 2023 16:55)  simvastatin 80 mg oral tablet: 1 tab(s) orally once a day (at bedtime) (22 Feb 2023 16:55)      MEDICATIONS  (STANDING):  albuterol/ipratropium for Nebulization 3 milliLiter(s) Nebulizer every 6 hours  aspirin enteric coated 81 milliGRAM(s) Oral daily  atorvastatin 40 milliGRAM(s) Oral at bedtime  budesonide 160 MICROgram(s)/formoterol 4.5 MICROgram(s) Inhaler 2 Puff(s) Inhalation two times a day  carvedilol 6.25 milliGRAM(s) Oral every 12 hours  dextrose 5%. 1000 milliLiter(s) (50 mL/Hr) IV Continuous <Continuous>  dextrose 5%. 1000 milliLiter(s) (100 mL/Hr) IV Continuous <Continuous>  dextrose 50% Injectable 25 Gram(s) IV Push once  dextrose 50% Injectable 12.5 Gram(s) IV Push once  dextrose 50% Injectable 25 Gram(s) IV Push once  glucagon  Injectable 1 milliGRAM(s) IntraMuscular once  insulin lispro (ADMELOG) corrective regimen sliding scale   SubCutaneous three times a day before meals  levothyroxine 100 MICROGram(s) Oral daily  losartan 25 milliGRAM(s) Oral daily  methylPREDNISolone sodium succinate Injectable 20 milliGRAM(s) IV Push every 6 hours  multivitamin 1 Tablet(s) Oral daily  rivaroxaban 10 milliGRAM(s) Oral daily      TELEMETRY: 	    ECG:  	  RADIOLOGY:   DIAGNOSTIC TESTING:  [ ] Echocardiogram:  [ ]  Catheterization:  [ ] Stress Test:    OTHER: 	    LABS:	 	    Troponin T, High Sensitivity Result: 13 ng/L [0 - 51] (02-22 @ 15:24)  Troponin T, High Sensitivity Result: 15 ng/L [0 - 51] (02-22 @ 12:15)                          15.1   8.05  )-----------( 162      ( 22 Feb 2023 12:15 )             47.3     02-22    138  |  99  |  13  ----------------------------<  160<H>  4.1   |  24  |  0.87    Ca    9.2      22 Feb 2023 12:15    TPro  7.5  /  Alb  4.3  /  TBili  0.6  /  DBili  x   /  AST  23  /  ALT  20  /  AlkPhos  58  02-22            
CARDIOLOGY FOLLOW UP - Dr. Rose  Date of Service: 2/25/2023  CC: no events    Review of Systems:  Constitutional: No fever, weight loss, or fatigue  Respiratory: No cough, wheezing, or hemoptysis, no shortness of breath  Cardiovascular: No chest pain, palpitations, passing out, dizziness, or leg swelling  Gastrointestinal: No abd or epigastric pain. No nausea, vomiting, or hematemesis; no diarrhea or consiptaiton, no melena or hematochezia  Vascular: No edema     TELEMETRY:    PHYSICAL EXAM:  T(C): 36.9 (02-25-23 @ 05:06), Max: 37 (02-24-23 @ 12:45)  HR: 72 (02-25-23 @ 05:06) (69 - 82)  BP: 124/76 (02-25-23 @ 05:06) (116/73 - 128/74)  RR: 20 (02-25-23 @ 05:06) (20 - 20)  SpO2: 94% (02-25-23 @ 05:06) (92% - 95%)  Wt(kg): --  I&O's Summary    24 Feb 2023 07:01  -  25 Feb 2023 07:00  --------------------------------------------------------  IN: 960 mL / OUT: 0 mL / NET: 960 mL        Appearance: Normal	  Cardiovascular: Normal S1 S2,RRR, No JVD, No murmurs  Respiratory: Lungs clear to auscultation	  Gastrointestinal:  Soft, Non-tender, + BS	  Extremities: Normal range of motion, No clubbing, cyanosis or edema  Vascular: Peripheral pulses palpable 2+ bilaterally       Home Medications:  aspirin 81 mg oral delayed release tablet: 1 tab(s) orally once a day (22 Feb 2023 16:55)  carvedilol 6.25 mg oral tablet: 1 tab(s) orally 2 times a day (22 Feb 2023 16:55)  Jardiance 25 mg oral tablet: 1 tab(s) orally once a day (in the morning) (22 Feb 2023 16:55)  levothyroxine 100 mcg (0.1 mg) oral tablet: 1 tab(s) orally once a day (22 Feb 2023 16:55)  losartan 25 mg oral tablet: 1 tab(s) orally once a day (22 Feb 2023 16:55)  metFORMIN 500 mg oral tablet, extended release: 1 tab(s) orally 2 times a day (22 Feb 2023 16:55)  multivitamin: 1 tab(s) orally once a day (22 Feb 2023 16:55)  simvastatin 80 mg oral tablet: 1 tab(s) orally once a day (at bedtime) (22 Feb 2023 16:55)        MEDICATIONS  (STANDING):  albuterol/ipratropium for Nebulization 3 milliLiter(s) Nebulizer every 6 hours  aspirin enteric coated 81 milliGRAM(s) Oral daily  atorvastatin 40 milliGRAM(s) Oral at bedtime  budesonide 160 MICROgram(s)/formoterol 4.5 MICROgram(s) Inhaler 2 Puff(s) Inhalation two times a day  carvedilol 6.25 milliGRAM(s) Oral every 12 hours  dextrose 5%. 1000 milliLiter(s) (50 mL/Hr) IV Continuous <Continuous>  dextrose 5%. 1000 milliLiter(s) (100 mL/Hr) IV Continuous <Continuous>  dextrose 50% Injectable 25 Gram(s) IV Push once  dextrose 50% Injectable 12.5 Gram(s) IV Push once  dextrose 50% Injectable 25 Gram(s) IV Push once  glucagon  Injectable 1 milliGRAM(s) IntraMuscular once  insulin glargine Injectable (LANTUS) 26 Unit(s) SubCutaneous at bedtime  insulin lispro (ADMELOG) corrective regimen sliding scale   SubCutaneous three times a day before meals  insulin lispro Injectable (ADMELOG) 10 Unit(s) SubCutaneous three times a day before meals  levothyroxine 100 MICROGram(s) Oral daily  losartan 25 milliGRAM(s) Oral daily  multivitamin 1 Tablet(s) Oral daily  predniSONE   Tablet 40 milliGRAM(s) Oral daily  rivaroxaban 10 milliGRAM(s) Oral daily        EKG:  RADIOLOGY:  DIAGNOSTIC TESTING:  [ ] Echocardiogram:  [ ] Catherterization:  [ ] Stress Test:  OTHER:     LABS:	 	                          14.9   9.00  )-----------( 160      ( 23 Feb 2023 10:10 )             45.7     02-23    141  |  100  |  30<H>  ----------------------------<  263<H>  4.2   |  22  |  0.76    Ca    9.1      23 Feb 2023 10:10            CARDIAC MARKERS:                  
Date of Service: 02-24-23 @ 16:08    Patient is a 72y old  Male who presents with a chief complaint of PNA (24 Feb 2023 12:19)      Any change in ROS: on 2 L : chloe feels  much better    MEDICATIONS  (STANDING):  albuterol/ipratropium for Nebulization 3 milliLiter(s) Nebulizer every 6 hours  aspirin enteric coated 81 milliGRAM(s) Oral daily  atorvastatin 40 milliGRAM(s) Oral at bedtime  budesonide 160 MICROgram(s)/formoterol 4.5 MICROgram(s) Inhaler 2 Puff(s) Inhalation two times a day  carvedilol 6.25 milliGRAM(s) Oral every 12 hours  dextrose 5%. 1000 milliLiter(s) (50 mL/Hr) IV Continuous <Continuous>  dextrose 5%. 1000 milliLiter(s) (100 mL/Hr) IV Continuous <Continuous>  dextrose 50% Injectable 25 Gram(s) IV Push once  dextrose 50% Injectable 12.5 Gram(s) IV Push once  dextrose 50% Injectable 25 Gram(s) IV Push once  glucagon  Injectable 1 milliGRAM(s) IntraMuscular once  insulin glargine Injectable (LANTUS) 26 Unit(s) SubCutaneous at bedtime  insulin lispro (ADMELOG) corrective regimen sliding scale   SubCutaneous three times a day before meals  insulin lispro Injectable (ADMELOG) 10 Unit(s) SubCutaneous three times a day before meals  levothyroxine 100 MICROGram(s) Oral daily  losartan 25 milliGRAM(s) Oral daily  multivitamin 1 Tablet(s) Oral daily  predniSONE   Tablet 40 milliGRAM(s) Oral daily  rivaroxaban 10 milliGRAM(s) Oral daily    MEDICATIONS  (PRN):  dextrose Oral Gel 15 Gram(s) Oral once PRN Blood Glucose LESS THAN 70 milliGRAM(s)/deciliter    Vital Signs Last 24 Hrs  T(C): 37 (24 Feb 2023 12:45), Max: 37 (24 Feb 2023 12:45)  T(F): 98.6 (24 Feb 2023 12:45), Max: 98.6 (24 Feb 2023 12:45)  HR: 70 (24 Feb 2023 12:45) (70 - 83)  BP: 116/73 (24 Feb 2023 12:45) (107/66 - 121/72)  BP(mean): --  RR: 20 (24 Feb 2023 12:45) (19 - 20)  SpO2: 92% (24 Feb 2023 12:45) (91% - 94%)    Parameters below as of 24 Feb 2023 12:45  Patient On (Oxygen Delivery Method): nasal cannula  O2 Flow (L/min): 2      I&O's Summary    23 Feb 2023 07:01  -  24 Feb 2023 07:00  --------------------------------------------------------  IN: 360 mL / OUT: 0 mL / NET: 360 mL    24 Feb 2023 07:01  -  24 Feb 2023 16:08  --------------------------------------------------------  IN: 480 mL / OUT: 0 mL / NET: 480 mL          Physical Exam:   GENERAL: NAD, well-groomed, well-developed  HEENT: VALDO/   Atraumatic, Normocephalic  ENMT: No tonsillar erythema, exudates, or enlargement; Moist mucous membranes, Good dentition, No lesions  NECK: Supple, No JVD, Normal thyroid  CHEST/LUNG: Clear to auscultaion,-  CVS: Regular rate and rhythm; No murmurs, rubs, or gallops  GI: : Soft, Nontender, Nondistended; Bowel sounds present  NERVOUS SYSTEM:  Alert & Oriented X3  EXTREMITIES:  2+ Peripheral Pulses, No clubbing, cyanosis, or edema  LYMPH: No lymphadenopathy noted  SKIN: No rashes or lesions  ENDOCRINOLOGY: No Thyromegaly  PSYCH: Appropriate    Labs:  31                            14.9   9.00  )-----------( 160      ( 23 Feb 2023 10:10 )             45.7                         15.1   8.05  )-----------( 162      ( 22 Feb 2023 12:15 )             47.3     02-23    141  |  100  |  30<H>  ----------------------------<  263<H>  4.2   |  22  |  0.76  02-22    138  |  99  |  13  ----------------------------<  160<H>  4.1   |  24  |  0.87    Ca    9.1      23 Feb 2023 10:10    TPro  7.5  /  Alb  4.3  /  TBili  0.6  /  DBili  x   /  AST  23  /  ALT  20  /  AlkPhos  58  02-22    CAPILLARY BLOOD GLUCOSE      POCT Blood Glucose.: 250 mg/dL (24 Feb 2023 12:13)  POCT Blood Glucose.: 193 mg/dL (24 Feb 2023 08:22)  POCT Blood Glucose.: 252 mg/dL (23 Feb 2023 21:04)  POCT Blood Glucose.: 194 mg/dL (23 Feb 2023 17:05)        rad< from: CT Chest No Cont (02.23.23 @ 09:12) >  COMPARISON: No prior chest CT available for comparison.    FINDINGS:    Lungs/Airways/Pleura: The central airways are patent. No pleural   effusion. Focal calcified pleural plaque at the posteromedial left base.   There is multifocal clusters of tree-in-bud nodules in the left lower   lobe.    Mediastinum/Lymph nodes: No thoracic adenopathy. Heterogeneously enlarged   left thyroid lobe with mild mass effect on the trachea.    Heart and Vessels: 4.1 cm mid ascending aorta. The heart is normal in   size. Coronary artery calcification and stenting. Subendocardial fatty   metaplasia at the LV apex. No pericardial effusion.    Upper Abdomen: Hepatic steatosis.    Osseous structures and Soft Tissues: No aggressive bone lesions.    IMPRESSION:  Multifocal clusters of tree-in-bud nodules in the left lower lobe,   related to infectious/inflammatory small airways disease.    --- End of Report ---            KEN RUIZ M.D., Attending Radiologist  This document has been electronically signed. Feb 23 2023  9:54AM    < end of copied text >      Procalcitonin, Serum: 0.07 ng/mL (02-22 @ 15:24)  Serum Pro-Brain Natriuretic Peptide: 244 pg/mL (02-22 @ 12:15)        RECENT CULTURES:        RESPIRATORY CULTURES:          Studies  Chest X-RAY  CT SCAN Chest   Venous Dopplers: LE:   CT Abdomen  Others              
Date of Service: 02-25-23 @ 15:07    Patient is a 72y old  Male who presents with a chief complaint of PNA (24 Feb 2023 12:19)      Any change in ROS:  doing much better:       MEDICATIONS  (STANDING):  albuterol/ipratropium for Nebulization 3 milliLiter(s) Nebulizer every 6 hours  aspirin enteric coated 81 milliGRAM(s) Oral daily  atorvastatin 40 milliGRAM(s) Oral at bedtime  budesonide 160 MICROgram(s)/formoterol 4.5 MICROgram(s) Inhaler 2 Puff(s) Inhalation two times a day  carvedilol 6.25 milliGRAM(s) Oral every 12 hours  dextrose 5%. 1000 milliLiter(s) (50 mL/Hr) IV Continuous <Continuous>  dextrose 5%. 1000 milliLiter(s) (100 mL/Hr) IV Continuous <Continuous>  dextrose 50% Injectable 25 Gram(s) IV Push once  dextrose 50% Injectable 12.5 Gram(s) IV Push once  dextrose 50% Injectable 25 Gram(s) IV Push once  glucagon  Injectable 1 milliGRAM(s) IntraMuscular once  insulin glargine Injectable (LANTUS) 26 Unit(s) SubCutaneous at bedtime  insulin lispro (ADMELOG) corrective regimen sliding scale   SubCutaneous three times a day before meals  insulin lispro Injectable (ADMELOG) 10 Unit(s) SubCutaneous three times a day before meals  levothyroxine 100 MICROGram(s) Oral daily  losartan 25 milliGRAM(s) Oral daily  multivitamin 1 Tablet(s) Oral daily  predniSONE   Tablet 40 milliGRAM(s) Oral daily  rivaroxaban 10 milliGRAM(s) Oral daily    MEDICATIONS  (PRN):  dextrose Oral Gel 15 Gram(s) Oral once PRN Blood Glucose LESS THAN 70 milliGRAM(s)/deciliter    Vital Signs Last 24 Hrs  T(C): 36.4 (25 Feb 2023 11:38), Max: 36.9 (25 Feb 2023 05:06)  T(F): 97.6 (25 Feb 2023 11:38), Max: 98.4 (25 Feb 2023 05:06)  HR: 73 (25 Feb 2023 11:38) (69 - 82)  BP: 116/71 (25 Feb 2023 11:38) (116/71 - 128/74)  BP(mean): --  RR: 20 (25 Feb 2023 11:38) (20 - 20)  SpO2: 95% (25 Feb 2023 11:38) (94% - 95%)    Parameters below as of 25 Feb 2023 11:38  Patient On (Oxygen Delivery Method): nasal cannula  O2 Flow (L/min): 2      I&O's Summary    24 Feb 2023 07:01  -  25 Feb 2023 07:00  --------------------------------------------------------  IN: 960 mL / OUT: 0 mL / NET: 960 mL    25 Feb 2023 07:01  -  25 Feb 2023 15:07  --------------------------------------------------------  IN: 720 mL / OUT: 0 mL / NET: 720 mL          Physical Exam:   GENERAL: NAD, well-groomed, well-developed  HEENT: VALDO/   Atraumatic, Normocephalic  ENMT: No tonsillar erythema, exudates, or enlargement; Moist mucous membranes, Good dentition, No lesions  NECK: Supple, No JVD, Normal thyroid  CHEST/LUNG: Clear to auscultaion-  CVS: Regular rate and rhythm; No murmurs, rubs, or gallops  GI: : Soft, Nontender, Nondistended; Bowel sounds present  NERVOUS SYSTEM:  Alert & Oriented X3  EXTREMITIES:  2+ Peripheral Pulses, No clubbing, cyanosis, or edema  LYMPH: No lymphadenopathy noted  SKIN: No rashes or lesions  ENDOCRINOLOGY: No Thyromegaly  PSYCH: Appropriate    Labs:  31                            14.9   9.00  )-----------( 160      ( 23 Feb 2023 10:10 )             45.7                         15.1   8.05  )-----------( 162      ( 22 Feb 2023 12:15 )             47.3     02-23    141  |  100  |  30<H>  ----------------------------<  263<H>  4.2   |  22  |  0.76  02-22    138  |  99  |  13  ----------------------------<  160<H>  4.1   |  24  |  0.87      TPro  7.5  /  Alb  4.3  /  TBili  0.6  /  DBili  x   /  AST  23  /  ALT  20  /  AlkPhos  58  02-22    CAPILLARY BLOOD GLUCOSE      POCT Blood Glucose.: 191 mg/dL (25 Feb 2023 12:08)  POCT Blood Glucose.: 165 mg/dL (25 Feb 2023 08:28)  POCT Blood Glucose.: 177 mg/dL (24 Feb 2023 21:50)  POCT Blood Glucose.: 221 mg/dL (24 Feb 2023 17:16)        rad< from: CT Chest No Cont (02.23.23 @ 09:12) >    FINDINGS:    Lungs/Airways/Pleura: The central airways are patent. No pleural   effusion. Focal calcified pleural plaque at the posteromedial left base.   There is multifocal clusters of tree-in-bud nodules in the left lower   lobe.    Mediastinum/Lymph nodes: No thoracic adenopathy. Heterogeneously enlarged   left thyroid lobe with mild mass effect on the trachea.    Heart and Vessels: 4.1 cm mid ascending aorta. The heart is normal in   size. Coronary artery calcification and stenting. Subendocardial fatty   metaplasia at the LV apex. No pericardial effusion.    Upper Abdomen: Hepatic steatosis.    Osseous structures and Soft Tissues: No aggressive bone lesions.    IMPRESSION:  Multifocal clusters of tree-in-bud nodules in the left lower lobe,   related to infectious/inflammatory small airways disease.    --- End of Report ---            KEN RUIZ M.D., Attending Radiologist  This document has been electronically signed. Feb 23 2023  9:54AM    < end of copied text >      Procalcitonin, Serum: 0.07 ng/mL (02-22 @ 15:24)        RECENT CULTURES:        RESPIRATORY CULTURES:          Studies  Chest X-RAY  CT SCAN Chest   Venous Dopplers: LE:   CT Abdomen  Others

## 2023-04-20 ENCOUNTER — EMERGENCY (EMERGENCY)
Facility: HOSPITAL | Age: 73
LOS: 1 days | Discharge: HOME | End: 2023-04-20
Attending: EMERGENCY MEDICINE
Payer: COMMERCIAL

## 2023-04-20 VITALS
TEMPERATURE: 98 F | HEIGHT: 68 IN | SYSTOLIC BLOOD PRESSURE: 170 MMHG | DIASTOLIC BLOOD PRESSURE: 84 MMHG | WEIGHT: 209 LBS | OXYGEN SATURATION: 95 % | RESPIRATION RATE: 20 BRPM | HEART RATE: 79 BPM

## 2023-04-20 PROBLEM — E78.5 HYPERLIPIDEMIA, UNSPECIFIED: Chronic | Status: ACTIVE | Noted: 2023-02-22

## 2023-04-20 PROBLEM — E03.9 HYPOTHYROIDISM, UNSPECIFIED: Chronic | Status: ACTIVE | Noted: 2023-02-22

## 2023-04-20 PROBLEM — I10 ESSENTIAL (PRIMARY) HYPERTENSION: Chronic | Status: ACTIVE | Noted: 2023-02-22

## 2023-04-20 PROBLEM — E11.9 TYPE 2 DIABETES MELLITUS WITHOUT COMPLICATIONS: Chronic | Status: ACTIVE | Noted: 2023-02-22

## 2023-04-20 LAB
ALBUMIN SERPL ELPH-MCNC: 4.5 G/DL — SIGNIFICANT CHANGE UP (ref 3.3–5)
ALP SERPL-CCNC: 54 U/L — SIGNIFICANT CHANGE UP (ref 40–120)
ALT FLD-CCNC: 15 U/L — SIGNIFICANT CHANGE UP (ref 10–45)
ANION GAP SERPL CALC-SCNC: 13 MMOL/L — SIGNIFICANT CHANGE UP (ref 5–17)
AST SERPL-CCNC: 16 U/L — SIGNIFICANT CHANGE UP (ref 10–40)
BASOPHILS # BLD AUTO: 0.04 K/UL — SIGNIFICANT CHANGE UP (ref 0–0.2)
BASOPHILS NFR BLD AUTO: 0.3 % — SIGNIFICANT CHANGE UP (ref 0–2)
BILIRUB SERPL-MCNC: 0.4 MG/DL — SIGNIFICANT CHANGE UP (ref 0.2–1.2)
BUN SERPL-MCNC: 17 MG/DL — SIGNIFICANT CHANGE UP (ref 7–23)
CALCIUM SERPL-MCNC: 9.7 MG/DL — SIGNIFICANT CHANGE UP (ref 8.4–10.5)
CHLORIDE SERPL-SCNC: 102 MMOL/L — SIGNIFICANT CHANGE UP (ref 96–108)
CO2 SERPL-SCNC: 27 MMOL/L — SIGNIFICANT CHANGE UP (ref 22–31)
CREAT SERPL-MCNC: 0.7 MG/DL — SIGNIFICANT CHANGE UP (ref 0.5–1.3)
EGFR: 97 ML/MIN/1.73M2 — SIGNIFICANT CHANGE UP
EOSINOPHIL # BLD AUTO: 0.15 K/UL — SIGNIFICANT CHANGE UP (ref 0–0.5)
EOSINOPHIL NFR BLD AUTO: 1.3 % — SIGNIFICANT CHANGE UP (ref 0–6)
GLUCOSE SERPL-MCNC: 132 MG/DL — HIGH (ref 70–99)
HCT VFR BLD CALC: 48.9 % — SIGNIFICANT CHANGE UP (ref 39–50)
HGB BLD-MCNC: 15.7 G/DL — SIGNIFICANT CHANGE UP (ref 13–17)
IMM GRANULOCYTES NFR BLD AUTO: 0.3 % — SIGNIFICANT CHANGE UP (ref 0–0.9)
LYMPHOCYTES # BLD AUTO: 4.65 K/UL — HIGH (ref 1–3.3)
LYMPHOCYTES # BLD AUTO: 40.4 % — SIGNIFICANT CHANGE UP (ref 13–44)
MCHC RBC-ENTMCNC: 31.7 PG — SIGNIFICANT CHANGE UP (ref 27–34)
MCHC RBC-ENTMCNC: 32.1 GM/DL — SIGNIFICANT CHANGE UP (ref 32–36)
MCV RBC AUTO: 98.8 FL — SIGNIFICANT CHANGE UP (ref 80–100)
MONOCYTES # BLD AUTO: 0.75 K/UL — SIGNIFICANT CHANGE UP (ref 0–0.9)
MONOCYTES NFR BLD AUTO: 6.5 % — SIGNIFICANT CHANGE UP (ref 2–14)
NEUTROPHILS # BLD AUTO: 5.88 K/UL — SIGNIFICANT CHANGE UP (ref 1.8–7.4)
NEUTROPHILS NFR BLD AUTO: 51.2 % — SIGNIFICANT CHANGE UP (ref 43–77)
NRBC # BLD: 0 /100 WBCS — SIGNIFICANT CHANGE UP (ref 0–0)
PLATELET # BLD AUTO: 179 K/UL — SIGNIFICANT CHANGE UP (ref 150–400)
POTASSIUM SERPL-MCNC: 4.4 MMOL/L — SIGNIFICANT CHANGE UP (ref 3.5–5.3)
POTASSIUM SERPL-SCNC: 4.4 MMOL/L — SIGNIFICANT CHANGE UP (ref 3.5–5.3)
PROT SERPL-MCNC: 7.4 G/DL — SIGNIFICANT CHANGE UP (ref 6–8.3)
RBC # BLD: 4.95 M/UL — SIGNIFICANT CHANGE UP (ref 4.2–5.8)
RBC # FLD: 13.8 % — SIGNIFICANT CHANGE UP (ref 10.3–14.5)
SODIUM SERPL-SCNC: 142 MMOL/L — SIGNIFICANT CHANGE UP (ref 135–145)
WBC # BLD: 11.5 K/UL — HIGH (ref 3.8–10.5)
WBC # FLD AUTO: 11.5 K/UL — HIGH (ref 3.8–10.5)

## 2023-04-20 PROCEDURE — 72125 CT NECK SPINE W/O DYE: CPT | Mod: 26,MA

## 2023-04-20 PROCEDURE — 70450 CT HEAD/BRAIN W/O DYE: CPT | Mod: 26,MA

## 2023-04-20 PROCEDURE — 71045 X-RAY EXAM CHEST 1 VIEW: CPT | Mod: 26

## 2023-04-20 PROCEDURE — 99285 EMERGENCY DEPT VISIT HI MDM: CPT

## 2023-04-20 NOTE — CONSULT NOTE ADULT - SUBJECTIVE AND OBJECTIVE BOX
p (1480)     HPI:  72M, on ASA81 for cardiac stent placed >10 years ago. S/p fall, seems syncopal in nature. CT shows small Left parafalcine SDH. Exam: AOx3, EOMI, no facial/drift, CHOWDHURY 5/5, SILT    =====================  PAST MEDICAL HISTORY   HTN (hypertension)    HLD (hyperlipidemia)    DM (diabetes mellitus)    Hypothyroid      PAST SURGICAL HISTORY     No Known Allergies      MEDICATIONS:  Antibiotics:    Neuro:    Other:      SOCIAL HISTORY:   Occupation:   Marital Status:     FAMILY HISTORY:      ROS: Negative except per HPI    LABS:                          15.7   11.50 )-----------( 179      ( 20 Apr 2023 18:44 )             48.9     04-20    142  |  102  |  17  ----------------------------<  132<H>  4.4   |  27  |  0.70    Ca    9.7      20 Apr 2023 18:44    TPro  7.4  /  Alb  4.5  /  TBili  0.4  /  DBili  x   /  AST  16  /  ALT  15  /  AlkPhos  54  04-20

## 2023-04-20 NOTE — ED PROVIDER NOTE - NSFOLLOWUPINSTRUCTIONS_ED_ALL_ED_FT
Fall Prevention    WHAT YOU NEED TO KNOW:    Fall prevention includes ways to make your home and other areas safer. It also includes ways you can move more carefully to prevent a fall. Health conditions that cause changes in your blood pressure, vision, or muscle strength and coordination may increase your risk for falls. Medicines may also increase your risk for falls if they make you dizzy, weak, or sleepy.     DISCHARGE INSTRUCTIONS:    Call 911 or have someone else call if:     You have fallen and are unconscious.      You have fallen and cannot move part of your body.    Contact your healthcare provider if:     You have fallen and have pain or a headache.      You have questions or concerns about your condition or care.    Fall prevention tips:     Stand or sit up slowly. This may help you keep your balance and prevent falls.      Use assistive devices as directed. Your healthcare provider may suggest that you use a cane or walker to help you keep your balance. You may need to have grab bars put in your bathroom near the toilet or in the shower.      Wear shoes that fit well and have soles that . Wear shoes both inside and outside. Use slippers with good . Do not wear shoes with high heels.      Wear a personal alarm. This is a device that allows you to call 911 if you fall and need help. Ask your healthcare provider for more information.      Stay active. Exercise can help strengthen your muscles and improve your balance. Your healthcare provider may recommend water aerobics or walking. He or she may also recommend physical therapy to improve your coordination. Never start an exercise program without talking to your healthcare provider first.       Manage your medical conditions. Keep all appointments with your healthcare providers. Visit your eye doctor as directed.    Home safety tips:     Add items to prevent falls in the bathroom. Put nonslip strips on your bath or shower floor to prevent you from slipping. Use a bath mat if you do not have carpet in the bathroom. This will prevent you from falling when you step out of the bath or shower. Use a shower seat so you do not need to stand while you shower. Sit on the toilet or a chair in your bathroom to dry yourself and put on clothing. This will prevent you from losing your balance from drying or dressing yourself while you are standing.       Keep paths clear. Remove books, shoes, and other objects from walkways and stairs. Place cords for telephones and lamps out of the way so that you do not need to walk over them. Tape them down if you cannot move them. Remove small rugs. If you cannot remove a rug, secure it with double-sided tape. This will prevent you from tripping.       Install bright lights in your home. Use night lights to help light paths to the bathroom or kitchen. Always turn on the light before you start walking.      Keep items you use often on shelves within reach. Do not use a step stool to help you reach an item.      Paint or place reflective tape on the edges of your stairs. This will help you see the stairs better.    Follow up with your healthcare provider as directed: Write down your questions so you remember to ask them during your visits.    During your stay, you were found to have subdural hematoma, which is a type of bleeding in the brain. You were seen by neurosurgery. Repeat CT scan without any expansion of hematoma. Neurosurgery without any plan for inpt work-up. You are recommended to follow-up with Dr. Kimball within 1-2 weeks after d/c. Please hold aspirin for 1 week. Please be careful while you walk. Please follow-up with your PCP in few days. If your dizziness or symptoms worsen, please contact your PCP. If unable to reach PCP, please return to ED.    The results of any blood tests and imaging studies completed during your visit today were discussed and explained to you and a copy provided with your discharge instructions. Please follow up with your primary care doctor within 48 hours.

## 2023-04-20 NOTE — ED ADULT NURSE NOTE - OBJECTIVE STATEMENT
73 y/o male presents to ED s/p fall. pt states he thinks he got up too fast and felt dizzy then fell back onto the table. upon assessment, pt noted with hematoma to back of head. pt states he usually ambulates on his own without an assistive device. denies sob or chest pain.

## 2023-04-20 NOTE — ED PROVIDER NOTE - OBJECTIVE STATEMENT
72 M pmh CAD w/stent on asa, HTN, DM, HLD presenting to ED for 1 day of dizziness and lightheadedness that is worse upon standing. No symptoms when sitting down, or moving head. Had fall yesterday and hit the back of head. Unclear if syncopal episode according to wife, but she thinks he may have had LOC for a few seconds. No chest pain, diaphoresis, or SOB during the episodes. Also endorsing to Rt sided back pain since the fall. Denies fever, vision changes, CP, SOB, abdominal pain, nausea, vomiting.

## 2023-04-20 NOTE — ED ADULT NURSE REASSESSMENT NOTE - NS ED NURSE REASSESS COMMENT FT1
Received report from ARTIS Coreas RN. Patient presenting to the ED s/p fall. Reports pain to lower back. Hematoma noted to the back of the head. Notes pain is "not too much right now." States pain and dizziness are worsened with movement. Comfortable in stretcher at present. Pending imaging.

## 2023-04-20 NOTE — CONSULT NOTE ADULT - ASSESSMENT
72M, on ASA81 for cardiac stent placed >10 years ago. S/p fall, seems syncopal in nature. CT shows small Left parafalcine SDH. Exam: AOx3, EOMI, no facial/drift, CHOWDHURY 5/5, SILT  -Get 4hr repeat CT  -No neurosurgical intervention  -ARU pending, no need for ddAVP given small size of heme  -Plt level is wnl, coag labs pending  -Hold AC/AP

## 2023-04-20 NOTE — ED PROVIDER NOTE - PATIENT PORTAL LINK FT
You can access the FollowMyHealth Patient Portal offered by [facility] by registering at the following website: [email or web address]. By joining [facility]’s FollowMyHealth portal, you will also be able to view your health information using other applications (apps) compatible with our system.

## 2023-04-20 NOTE — ED PROVIDER NOTE - PHYSICAL EXAMINATION
Gen: NAD  HEENT: EOMI, no nasal discharge, mucous membranes moist  CV: RRR, +S1/S2, no M/R/G, 2+ radial pulses b/l  Resp: CTAB, no W/R/R, no accessory muscle use, no increased work of breathing  GI: Abdomen soft non-distended, NTTP  MSK: No midline spinal tenderness, full ROM at neck. Able to ambulate. C-spine paravertebral spinal tenderness b/l. Small hematoma over left occipital scalp. strength 5/5 b/l UE and LE.   Neuro: A&Ox4, following commands, moving all four extremities spontaneously  Psych: appropriate mood

## 2023-04-20 NOTE — ED PROVIDER NOTE - PROGRESS NOTE DETAILS
Received signout from Dr. Case pending neurosurgical eval.  72-year-old male with fall and loss of consciousness on aspirin for remote history of cardiac stents.  To have small subdural  Neurosurgery wanted repeat CAT scan  Repeat CAT scan appears stable reviewed by neurosurgery I spoke with the patient and wife in the both prefer to go home and follow-up as an outpatient.  I explained that they should not take aspirin for 1 week and until seeing their own primary care doctor.  Ambulated in the ED and safe for discharge  DJ Thierry JERRY:  Repeat CT scan 4 hours, stable. Evaluated by neurosurgery. No plan for inpt intervention. Hold ASA for 1 week and f/u with Dr. Kimball within 1-2 weeks after d/c. Able to ambulate without any difficulties.

## 2023-04-20 NOTE — ED PROVIDER NOTE - ATTENDING CONTRIBUTION TO CARE
Attending MD Case:  I personally have seen and examined this patient. I have performed a substantive portion of the visit including all aspects of the medical decision making.  Resident note reviewed and agree on plan of care and except where noted.      72-year-old gentleman with a history of CAD on aspirin, hypertension diabetes hyperlipidemia presenting for evaluation of dizziness and a fall yesterday.  The patient struck the left posterior occiput, there was loss of consciousness for seconds.  Patient stated that he had episodic dizziness, may be as if it was a spinning sensation however the patient is not entirely clear.  Today he is having some right mid back pain, no dizziness currently.  He denies any chest pain shortness of breath or palpitations.    Patient's vital signs are notable for slightly elevated blood pressure 170 systolic otherwise nonactionable.  Patient sitting in the stretcher in no apparent distress.  There is a left posterior occipital hematoma, nontender cervical spine, nontender thoracic and lumbar spine.  No localized tenderness of the right chest wall where patient is indicating pain.  Breath sounds are present equal bilaterally.  Abdomen nontender.  Awake and alert. Symmetric eyebrow raise, symmetric eyelid closure. PERRL b/l, EOMI b/l, symmetric smile, tongue midline.  5/5  strength bilaterally, 5/5 elbow flexion bilaterally, 5/5 elbow extension bilaterally, 5/5 shoulder shrug b/l.  5/5 plantar and dorsiflexion b/l, 5/5 knee flexion and extension b/l, 5/5 hip flexion and extension b/l. Sensation intact and symmetric grossly to light touch throughout face and bilateral upper and lower extremities,  Finger to nose normal bilaterally. Steady gait.    Patient presenting for nonspecific dizziness and a fall with head trauma.  Patient's examination is notable for a left occipital hematoma.  Nonfocal neurologic exam.  Regarding the patient's dizziness, I do not suspect a central etiology for his dizziness (i.e. posterior CVA) given patient has a nonfocal neurologic exam and his symptoms were episodic and now resolved.  ECG is unchanged from priors, he has a known baseline first-degree AV block no other changes to his cardiogram.  Overall I do not suspect primary cardiac syncope or dizziness in this patient.  We will place patient on the monitor during his ED stay to screen for dysrhythmias, we will obtain CT head and CT cervical spine.  Screening chest x-ray to rule out pneumothorax.  We will obtain screening labs to rule out lab derangements and then reassessment.        *The above represents an initial assessment/impression. Please refer to progress notes for potential changes in patient clinical course*

## 2023-04-21 VITALS
RESPIRATION RATE: 18 BRPM | TEMPERATURE: 98 F | HEART RATE: 76 BPM | OXYGEN SATURATION: 94 % | SYSTOLIC BLOOD PRESSURE: 151 MMHG | DIASTOLIC BLOOD PRESSURE: 84 MMHG

## 2023-04-21 LAB
APTT BLD: 29.3 SEC — SIGNIFICANT CHANGE UP (ref 27.5–35.5)
INR BLD: 1.09 RATIO — SIGNIFICANT CHANGE UP (ref 0.88–1.16)
PLATELET RESPONSE ASPIRIN RESULT: 453 ARU — SIGNIFICANT CHANGE UP (ref 350–700)
PROTHROM AB SERPL-ACNC: 12.5 SEC — SIGNIFICANT CHANGE UP (ref 10.5–13.4)

## 2023-04-21 PROCEDURE — 99285 EMERGENCY DEPT VISIT HI MDM: CPT | Mod: 25

## 2023-04-21 PROCEDURE — 82947 ASSAY GLUCOSE BLOOD QUANT: CPT

## 2023-04-21 PROCEDURE — 85018 HEMOGLOBIN: CPT

## 2023-04-21 PROCEDURE — 70450 CT HEAD/BRAIN W/O DYE: CPT | Mod: MA

## 2023-04-21 PROCEDURE — 84132 ASSAY OF SERUM POTASSIUM: CPT

## 2023-04-21 PROCEDURE — 82330 ASSAY OF CALCIUM: CPT

## 2023-04-21 PROCEDURE — 70450 CT HEAD/BRAIN W/O DYE: CPT | Mod: 26,MA

## 2023-04-21 PROCEDURE — 82565 ASSAY OF CREATININE: CPT

## 2023-04-21 PROCEDURE — 84295 ASSAY OF SERUM SODIUM: CPT

## 2023-04-21 PROCEDURE — 36415 COLL VENOUS BLD VENIPUNCTURE: CPT

## 2023-04-21 PROCEDURE — 93005 ELECTROCARDIOGRAM TRACING: CPT

## 2023-04-21 PROCEDURE — 71045 X-RAY EXAM CHEST 1 VIEW: CPT

## 2023-04-21 PROCEDURE — 85576 BLOOD PLATELET AGGREGATION: CPT

## 2023-04-21 PROCEDURE — 82803 BLOOD GASES ANY COMBINATION: CPT

## 2023-04-21 PROCEDURE — 85025 COMPLETE CBC W/AUTO DIFF WBC: CPT

## 2023-04-21 PROCEDURE — 85610 PROTHROMBIN TIME: CPT

## 2023-04-21 PROCEDURE — 85730 THROMBOPLASTIN TIME PARTIAL: CPT

## 2023-04-21 PROCEDURE — 85014 HEMATOCRIT: CPT

## 2023-04-21 PROCEDURE — 83605 ASSAY OF LACTIC ACID: CPT

## 2023-04-21 PROCEDURE — 82435 ASSAY OF BLOOD CHLORIDE: CPT

## 2023-04-21 PROCEDURE — 72125 CT NECK SPINE W/O DYE: CPT | Mod: MA

## 2023-04-21 PROCEDURE — 80053 COMPREHEN METABOLIC PANEL: CPT

## 2023-04-21 RX ORDER — CHLORHEXIDINE GLUCONATE 213 G/1000ML
1 SOLUTION TOPICAL
Refills: 0 | Status: DISCONTINUED | OUTPATIENT
Start: 2023-04-21 | End: 2023-04-24

## 2023-04-21 NOTE — CHART NOTE - NSCHARTNOTEFT_GEN_A_CORE
Repeat CTH is stable.  -No neurosurgical intervention.  -Hold his ASA81 until outpt f/u with Dr. Kimball within 1-2 weeks after d/c

## 2023-06-29 ENCOUNTER — APPOINTMENT (OUTPATIENT)
Dept: NEUROLOGY | Facility: CLINIC | Age: 73
End: 2023-06-29
Payer: MEDICARE

## 2023-06-29 VITALS
BODY MASS INDEX: 31.07 KG/M2 | HEIGHT: 68 IN | HEART RATE: 62 BPM | SYSTOLIC BLOOD PRESSURE: 115 MMHG | RESPIRATION RATE: 16 BRPM | OXYGEN SATURATION: 96 % | WEIGHT: 205 LBS | TEMPERATURE: 97.1 F | DIASTOLIC BLOOD PRESSURE: 83 MMHG

## 2023-06-29 DIAGNOSIS — R42 DIZZINESS AND GIDDINESS: ICD-10-CM

## 2023-06-29 DIAGNOSIS — Z87.898 PERSONAL HISTORY OF OTHER SPECIFIED CONDITIONS: ICD-10-CM

## 2023-06-29 PROBLEM — Z00.00 ENCOUNTER FOR PREVENTIVE HEALTH EXAMINATION: Status: ACTIVE | Noted: 2023-06-29

## 2023-06-29 PROCEDURE — 99215 OFFICE O/P EST HI 40 MIN: CPT

## 2023-06-29 PROCEDURE — 99205 OFFICE O/P NEW HI 60 MIN: CPT

## 2023-06-29 RX ORDER — CARVEDILOL 6.25 MG/1
6.25 TABLET, FILM COATED ORAL
Refills: 0 | Status: ACTIVE | COMMUNITY
Start: 2023-06-29

## 2023-06-29 RX ORDER — METFORMIN HYDROCHLORIDE 500 MG/1
500 TABLET, COATED ORAL
Refills: 0 | Status: ACTIVE | COMMUNITY
Start: 2023-06-29

## 2023-06-29 RX ORDER — LEVOTHYROXINE SODIUM 0.1 MG/1
100 TABLET ORAL
Refills: 0 | Status: ACTIVE | COMMUNITY
Start: 2023-06-29

## 2023-06-29 RX ORDER — LOSARTAN POTASSIUM 25 MG/1
25 TABLET, FILM COATED ORAL
Refills: 0 | Status: ACTIVE | COMMUNITY
Start: 2023-06-29

## 2023-06-29 RX ORDER — SIMVASTATIN 80 MG/1
80 TABLET, FILM COATED ORAL
Refills: 0 | Status: ACTIVE | COMMUNITY
Start: 2023-06-29

## 2023-06-29 RX ORDER — EMPAGLIFLOZIN 25 MG/1
25 TABLET, FILM COATED ORAL
Refills: 0 | Status: ACTIVE | COMMUNITY
Start: 2023-06-29

## 2023-06-30 VITALS — DIASTOLIC BLOOD PRESSURE: 80 MMHG | SYSTOLIC BLOOD PRESSURE: 130 MMHG

## 2023-06-30 VITALS — SYSTOLIC BLOOD PRESSURE: 142 MMHG | DIASTOLIC BLOOD PRESSURE: 82 MMHG

## 2023-06-30 NOTE — ASSESSMENT
[FreeTextEntry1] : 71 YO RH man w/ PMH of  HTN, HLD, DM hypothyroidism, CAD s/p 2 stents 14 years ago presents for a follow up visit after a hospitalization at [facility] for a fall w/ head trauma. He was having nonspecific dizziness and was found to have a small left anterior subdural hemorrhage on CT. He has a known baseline of first degree AV block and did not have any prior changes to his EKG. He followed up with cardiology and presumed testing was unremarkable. Luis Manuel Hallpike, HINTS and orthostatics were negative and rest of clinical testing. He denies any neurological complaints at this time.\par \par Impression: resolved dizziness most likely peripheral in origin\par \par Plan:\par [] MR brain to assess possible meningioma on CT \par \par Communication via telephone in place. The patient will call the office if there is any new neurological complaints and will seek emergent evaluation for concerning red flag symptoms discussed.\par \par \par

## 2023-06-30 NOTE — REASON FOR VISIT
[Initial Evaluation] : an initial evaluation [Spouse] : spouse [Family Member] : family member [FreeTextEntry1] : dizziness

## 2023-06-30 NOTE — DATA REVIEWED
[de-identified] : \par ACC: [id number] EXAM: CT BRAIN ORDERED BY: XIN DARYNDEANNE\par \par PROCEDURE DATE: 04/21/2023\par \par \par \par INTERPRETATION: CLINICAL INFORMATION: Follow-up subdural hemorrhage\par \par TECHNIQUE: Noncontrast axial CT images were acquired through the head. Two-dimensional sagittal and coronal reformats were generated.\par \par COMPARISON STUDY: CT head 4/20/2023 at 8:43 PM\par \par FINDINGS:\par \par Stable 4 mm left anterior parafalcine subdural hemorrhage.\par \par No intraparenchymal hemorrhage, midline shift, central herniation, or hydrocephalus.\par \par The ventricles, sulci and cisternal spaces are stable in size and configuration and unremarkable for age. There is mild patchy white matter hypoattenuation which is nonspecific in etiology but likely related to microvascular disease.\par \par The visualized paranasal sinuses are clear. The mastoid air cells and middle ear cavities are clear.\par \par The soft tissues of the scalp are unremarkable. The calvarium is intact.\par \par IMPRESSION:\par \par Stable 4 mm left anterior parafalcine presumed subdural hemorrhage in the setting of trauma. A noncalcified parafalcine meningioma may also have this appearance and without more remote imaging cannot be differentiated.\par No new intracranial finding.\par \par --- End of Report ---\par \par \par \par \par  MARVIN WASHINGTON MD; Resident Radiologist\par This document has been electronically signed.\par CARROL CLARKE MD; Attending Radiologist\par This document has been electronically signed. Apr 21 2023 4:32AM

## 2023-06-30 NOTE — HISTORY OF PRESENT ILLNESS
[FreeTextEntry1] : NO COVID.\par COVID VACCINE FULL.\par \par 73 YO RH man accompanied by his family presents with episodic dizziness that first started at the end of April. After arising from bed he was  experiencing severe dizziness. He describes the dizziness as lightheadedness. This was occurring  mostly with position changes and would last for seconds. He experienced a fall with head trauma and was hospitalized at [facility]. He was found to have a small left anterior subdural hemorrhage. He has a known baseline of first degree AV block and did not have any prior changes to his EKG.  H He denies recent travel or illness. \par \par After he was discharged, the dizziness continued for a couple of weeks until resolution. He did follow up with his cardiologist and all presumed testing was unremarkable. He denies any neurological symptoms at this time including  headaches, vision changes, nausea, vomiting, speech difficulty/swallowing, weakness, hearing loss/tinnitus or paresthesias. He denies chest pain, palpitations or shortness of breath. \par \par He has a medical history HTN, HLD, DM hypothyroidism, CAD s/p 2 stents 14 years ago. No surgical history.He is retired and is . Former tobacco user. No family history of neurological problems. \par

## 2023-06-30 NOTE — PHYSICAL EXAM
[FreeTextEntry1] : Constitutional:  Patient was well-developed, well-nourished and in no acute distress. \par \par Head:  Normocephalic, atraumatic. Tympanic membranes were clear. \par \par Neck:  Supple with full range of motion. \par \par Cardiovascular:  Cardiac rhythm was regular without murmur. There were no carotid bruits. Peripheral pulses were full and symmetric. \par \par Respiratory:  Lungs were clear. \par \par Abdomen:  Soft and nontender. \par \par Spine:  Nontender. \par \par Skin:  There were no rashes. \par \par NEUROLOGICAL EXAMINATION:\par \par Mental Status: Patient was alert and oriented. Speech was fluent. There was no dysarthria. \par \par Cranial Nerves: \par \par II: Visual acuity was 20/ 20 bilaterally with near card. Pupils were equal and reactive. Visual fields were full. Funduscopic examination was normal. \par \par III, IV, VI:  Eye movements were full without nystagmus. \par \par V: Facial sensation was intact. \par \par VII: Facial strength was normal. \par \par VIII: Hearing was equal. \par \par IX, X: Palatal movement was normal. Phonation was normal. \par \par XI: Sternocleidomastoids and trapezii were normal. \par \par XII: Tongue was midline and movements normal. There was no lingual atrophy or fasciculations. \par \par Motor Examination: Muscle bulk, tone and strength were normal. \par \par Sensory Examination: Pinprick, vibration and joint position sense were intact. \par \par Reflexes: DTRs were 2+ throughout. \par \par Plantar Responses: Plantar responses were flexor. \par \par Coordination/Cerebellar Function: There was no dysmetria on finger to nose or heel to shin testing. \par \par Gait/Stance: Gait and tandem were normal. Romberg was negative.\par \par [place] Hallpike and HINTS: unremarkable\par

## 2023-08-28 NOTE — ED ADULT NURSE NOTE - IN THE PAST 12 MONTHS HAVE YOU USED DRUGS OTHER THAN THOSE REQUIRED FOR MEDICAL REASON?
6/14/2023    Future Appointments   Date Time Provider [address]   9/6/2023 12:00 PM TOM Taf[facility] No
